# Patient Record
Sex: FEMALE | Race: WHITE | NOT HISPANIC OR LATINO | Employment: FULL TIME | URBAN - NONMETROPOLITAN AREA
[De-identification: names, ages, dates, MRNs, and addresses within clinical notes are randomized per-mention and may not be internally consistent; named-entity substitution may affect disease eponyms.]

---

## 2017-02-01 ENCOUNTER — OFFICE VISIT (OUTPATIENT)
Dept: FAMILY MEDICINE CLINIC | Facility: CLINIC | Age: 52
End: 2017-02-01

## 2017-02-01 VITALS
HEIGHT: 68 IN | OXYGEN SATURATION: 96 % | HEART RATE: 91 BPM | DIASTOLIC BLOOD PRESSURE: 70 MMHG | TEMPERATURE: 98.7 F | BODY MASS INDEX: 28.79 KG/M2 | SYSTOLIC BLOOD PRESSURE: 112 MMHG | WEIGHT: 190 LBS

## 2017-02-01 DIAGNOSIS — J06.9 ACUTE URI: Primary | ICD-10-CM

## 2017-02-01 DIAGNOSIS — R05.9 COUGH: ICD-10-CM

## 2017-02-01 DIAGNOSIS — G44.89 OTHER HEADACHE SYNDROME: ICD-10-CM

## 2017-02-01 DIAGNOSIS — F40.298 PHONOPHOBIA: ICD-10-CM

## 2017-02-01 DIAGNOSIS — H53.149 PHOTOPHOBIA: ICD-10-CM

## 2017-02-01 PROBLEM — F43.20 ADJUSTMENT REACTION: Status: ACTIVE | Noted: 2017-02-01

## 2017-02-01 PROBLEM — R63.5 WEIGHT GAIN: Status: ACTIVE | Noted: 2017-02-01

## 2017-02-01 PROBLEM — R94.31 ABNORMAL ECG: Status: ACTIVE | Noted: 2017-02-01

## 2017-02-01 PROBLEM — R00.2 PALPITATIONS: Status: ACTIVE | Noted: 2017-02-01

## 2017-02-01 PROBLEM — R19.8 SENSATION OF FOREIGN BODY IN ESOPHAGUS: Status: ACTIVE | Noted: 2017-02-01

## 2017-02-01 PROBLEM — R09.A2 SENSATION OF FOREIGN BODY IN ESOPHAGUS: Status: ACTIVE | Noted: 2017-02-01

## 2017-02-01 PROBLEM — R07.9 CHEST PAIN: Status: ACTIVE | Noted: 2017-02-01

## 2017-02-01 PROBLEM — R07.9 CHEST PAIN: Status: RESOLVED | Noted: 2017-02-01 | Resolved: 2017-02-01

## 2017-02-01 PROCEDURE — 99214 OFFICE O/P EST MOD 30 MIN: CPT | Performed by: PHYSICIAN ASSISTANT

## 2017-02-01 RX ORDER — CLINDAMYCIN HYDROCHLORIDE 300 MG/1
300 CAPSULE ORAL 4 TIMES DAILY
Qty: 40 CAPSULE | Refills: 0 | Status: SHIPPED | OUTPATIENT
Start: 2017-02-01 | End: 2017-03-20

## 2017-02-01 RX ORDER — FLUTICASONE PROPIONATE 50 MCG
2 SPRAY, SUSPENSION (ML) NASAL DAILY
Qty: 1 EACH | Refills: 0 | Status: SHIPPED | OUTPATIENT
Start: 2017-02-01 | End: 2017-03-03

## 2017-02-01 RX ORDER — GUAIFENESIN AND DEXTROMETHORPHAN HYDROBROMIDE 600; 30 MG/1; MG/1
1 TABLET, EXTENDED RELEASE ORAL 2 TIMES DAILY
Qty: 45 TABLET | Refills: 0 | Status: SHIPPED | OUTPATIENT
Start: 2017-02-01 | End: 2017-03-20

## 2017-02-01 RX ORDER — CETIRIZINE HYDROCHLORIDE 10 MG/1
10 TABLET ORAL DAILY
Qty: 30 TABLET | Refills: 0 | Status: SHIPPED | OUTPATIENT
Start: 2017-02-01 | End: 2019-06-27

## 2017-02-01 RX ORDER — ACETAMINOPHEN, ASPIRIN AND CAFFEINE 250; 250; 65 MG/1; MG/1; MG/1
1 TABLET, FILM COATED ORAL EVERY 6 HOURS PRN
COMMUNITY
End: 2019-06-27

## 2017-02-01 NOTE — PROGRESS NOTES
"Subjective   Jd Perkins is a 51 y.o. female here today due to sinus pressure with greenish yellow drainage x 2 days, sore throat, cough AND HEADACHES FOR 1 MONTH    History of Present Illness     STARTED 1/30- SORE THROAT, CONGESTION, RUNNY NOSE, COUGHING, SNEEZING. NO V/D/FEVER. NO TREATMENT TODAY. NYQUIL LAST NIGHT AND EXCEDRIN FOR HEADACHE.     X 1 MONTH- WHEN COUGH OR SNEEZE, MAKES HEAD HURT UNTIL TAKES MEDICATION OR LAY DOWN. THIS WAS PRIOR TO GETTING SICK. HAS HAD MIGRAINE HEADACHE IN THE PAST - HAS NOT HAD MIGRAINE FOR 5 YEARS.  BRAIN SCAN AT AGE 19. HAS NOT HAD ANY OTHER BRAIN IMAGING. MOTHER AND GRANDMOTHER WITH MIGRAINE HEADACHES. DX WITH MIGRAINE HEADACHES BY DR STEEN. TOLD THAT SHE HAS CLUSTER MIGRAINES. TREATED WITH IMITREX AND TREXIMET. MAY HAVE 2 DAILY- REPORTS THEY RESOLVED THEN HAS NOT HAD IN 5-6 YEARS. HAS A HEADACHE- FEELS LIKE \"MY BRAIN IS SWIMMING OR FLOATING\"- IT'S ALL OVER BEHIND.     X 6-8 WEEKS AGO- HAS THE SAME FEELING IN THE SAME SPOT AS PREVIOUS MIGRAINES ONCE EVERY FEW DAYS AND EXCEDRIN MIGRAINE RELIEVES THEM. SOMETIMES MORE INTENSE THAN OTHERS- PHOTO-PHONOPHOBIA. NO NAUSEA, VOMITING, DIZZINESS, OTHER NEURO SYMPTOMS.     X 6-8 WEEKS- WHEN COULD COUGH OR SNEEZE HEAD HURTS ALL OVER. REPORTS HAS TO LAY DOWN FOR AN HOUR OR SO. REPORTS HAS HAD THE SAME HEADACHE BUT IN A DIFFERENT LOCATION WHEN HAS HAD SINUSITIS IN THE PAST. NOW BEHIND EYES. NO ASSOCIATED SYMPTOMS WITH THESE HEADACHES.     HAD [facility]- [facility]. RESOLVED. CAN STILL FEEL THAT ONE SIDE OF MOUTH IS NOT THE SAME AS THE OTHER. THEY ADVISED NO CVA SYMPTOMS.     HAS BEEN TAKING EXCEDRIN A COUPLE TIMES DAILY SOMETIMES. IF HOME ON WEEKENDS, MAY LAY DOWN INSTEAD OF TAKING THE EXCEDRIN.   The following portions of the patient's history were reviewed and updated as appropriate: allergies, current medications, past family history, past medical history, past social history, past surgical history and problem " list.    Review of Systems   Constitutional: Positive for fatigue.   HENT: Positive for congestion, sinus pressure and sore throat.    Respiratory: Positive for cough.    Neurological: Positive for headaches.   All other systems reviewed and are negative.      Objective   Physical Exam   Constitutional: She is oriented to person, place, and time. Vital signs are normal. She appears well-developed and well-nourished.   HENT:   Head: Normocephalic and atraumatic.   Right Ear: Tympanic membrane, external ear and ear canal normal.   Left Ear: Tympanic membrane, external ear and ear canal normal.   Nose: Nose normal.   Mouth/Throat: Uvula is midline, oropharynx is clear and moist and mucous membranes are normal.   Eyes: Conjunctivae and EOM are normal. Pupils are equal, round, and reactive to light.   Neck: Neck supple.   Cardiovascular: Normal rate, regular rhythm, normal heart sounds and intact distal pulses.  Exam reveals no gallop and no friction rub.    No murmur heard.  Pulmonary/Chest: Effort normal and breath sounds normal. No respiratory distress. She has no wheezes. She has no rhonchi. She has no rales.   Musculoskeletal: She exhibits no edema or deformity.   Neurological: She is alert and oriented to person, place, and time. She has normal strength and normal reflexes. She is not disoriented. No cranial nerve deficit or sensory deficit.   Skin: Skin is warm and dry.   Psychiatric: She has a normal mood and affect. Her speech is normal and behavior is normal. Judgment and thought content normal. She is not actively hallucinating. Cognition and memory are normal. She is attentive.   Nursing note and vitals reviewed.      Assessment/Plan   Jd was seen today for cough, headache, sinus pressure and sore throat.    Diagnoses and all orders for this visit:    Acute URI  -     cetirizine (zyrTEC) 10 MG tablet; Take 1 tablet by mouth Daily.  -     guaifenesin-dextromethorphan (MUCINEX DM)  MG tablet  "sustained-release 12 hour tablet; Take 1 tablet by mouth 2 (Two) Times a Day.  -     fluticasone (FLONASE) 50 MCG/ACT nasal spray; 2 sprays into each nostril Daily for 30 days. Administer 2 sprays in each nostril for each dose.  -     clindamycin (CLEOCIN) 300 MG capsule; Take 1 capsule by mouth 4 (Four) Times a Day.    Cough    Other headache syndrome  -     MRI Angiogram Head Without Contrast  -     MRI Brain With & Without Contrast    Photophobia  -     MRI Angiogram Head Without Contrast  -     MRI Brain With & Without Contrast    Phonophobia  -     MRI Angiogram Head Without Contrast  -     MRI Brain With & Without Contrast      Patient Instructions   51 YEAR OLD FEMALE WHO PRESENTS TODAY WITH 2 DIFFERENT HEADACHE SYMPTOMS X 6-8 WEEKS. SHE STATES SHE WAS PREVIOUSLY DIAGNOSED WITH MIGRAINE HEADACHES ABOUT 15 YEARS AGO. SHE STATES SHE HAS NOT HAD THESE HEADACHES IN 5-6 YEARS. PATIENT REPORTS HER MIGRAINES ARE GENERALLY RIGHT SIDED HEADACHES ASSOCIATED WITH PHOTOPHOBIA AND PHONOPHOBIA. NO NAUSEA, VISION CHANGES, DIZZINESS OR OTHER NEUROLOGICAL SYMPTOMS. THEY SEEM TO HAVE RECURRED ABOUT 6-8 WEEKS AGO AND ARE OCCURRING EVERY FEW DAYS. USUALLY RELIEVED BY EXCEDRIN MIGRAINE. PATIENT ALSO STARTED WITH HEADACHE \"ALL OVER\" HER HEAD THEN REPORTS THEY ARE BEHIND HER EYES- LOCATION IS DIFFICULT TO DETERMINE. SHE STATES THEY OCCUR WHEN SHE COUGHS O SNEEZES AND ARE VERY SEVERE. SHE HAS TO GO LAY DOWN WITH THESE HEADACHES. NO OTHER ASSOCIATED SYMPTOMS THAT SHE IS AWARE. PATIENT IS POOR HISTORIAN, SO HISTORY AND ASSOCIATED SYMPTOMS ARE DIFFICULT TO DETERMINE, HOWEVER, IT IS CLEAR SHE HAS HAD RECURRENCE OF WHAT SHE RELATES TO PREVIOUSLY DIAGNOSED MIGRAINE HEADACHES THAT HAD RESOLVED FOR 5-6 YEARS AS WELL AS A NEW SYMPTOM OF SEVERE HEADACHE WITH COUGHING AND SNEEZING. FAMILY HISTORY IS SIGNIFICANT FOR MOTHER AND GRANDMOTHER WITH MIGRAINE HEADACHES. SHE IS TAKING EXCEDRIN MIGRAINE A COUPLE TIMES DAILY MOST OF THE TIME. SOMETIMES " "SHE CAN LAY DOWN ON WEEKENDS WITHOUT TAKING MEDICATION. PATIENT STATES SHE HAD A \"BRAIN SCAN\" AT AGE 19 BUT NO IMAGING SINCE. I WILL CHECK MRI/MRA BRAIN. PATIENT WAS COUNSELED ABOUT REBOUND HEADACHES. SHE SHOULD AVOID OTC MEDICATIONS, SUCH AS TYLENOL, IBUPROFEN, ALEVE, EXCEDRIN MIGRAINE FOR NOW. SHE MAY BE ABLE TO TAKE OCCASIONALLY IN THE FUTURE BUT SHOULD AVOID THEM TO SEE IF WE CAN DECREASE THE FREQUENCY OF HER HEADACHES. IF NORMAL MRI/MRA, WE CAN CONSIDER ELAVIL AT BEDTIME TO SEE IF THIS DECREASES THE FREQUENCY OF HEADACHES. SHE HAS DONE WELL ON IMITREX AND TREXIMET IN THE PAST. WE MAY NEED TO GIVE THESE AS ABORTIVE MEDICATIONS.     SINCE 1/30/17, INCREASED SORE THROAT, RHINORRHEA, CONGESTION, COUGHING AND SNEEZING. I WILL HAVE HER TAKE ZYRTEC AND FLONASE. WITH INCREASED FREQUENCY OF HEADACHES X 6-8 WEEKS (ONE TYPE SHE RELATES AS SIMILAR TO WHEN SHE HAS SINUSITIS), I WILL GIVE CLINDAMYCIN 4 X DAILY. SHE SHOULD BE SEEN IF NO IMPROVEMENT OR WORSENING.     OTHERWISE, AWAIT IMAGING. PATIENT DOES NEED TO RETURN FOR ROUTINE FOLLOW UP AND CPE.              "

## 2017-02-07 ENCOUNTER — HOSPITAL ENCOUNTER (OUTPATIENT)
Dept: MRI IMAGING | Facility: HOSPITAL | Age: 52
Discharge: HOME | End: 2017-02-07
Admitting: PHYSICIAN ASSISTANT

## 2017-02-07 PROCEDURE — 70553 MRI BRAIN STEM W/O & W/DYE: CPT

## 2017-02-07 PROCEDURE — 0 GADOBENATE DIMEGLUMINE 529 MG/ML SOLUTION: Performed by: PHYSICIAN ASSISTANT

## 2017-02-07 PROCEDURE — 70544 MR ANGIOGRAPHY HEAD W/O DYE: CPT

## 2017-02-07 PROCEDURE — 82565 ASSAY OF CREATININE: CPT

## 2017-02-07 PROCEDURE — A9577 INJ MULTIHANCE: HCPCS | Performed by: PHYSICIAN ASSISTANT

## 2017-02-07 RX ADMIN — GADOBENATE DIMEGLUMINE 18 ML: 529 INJECTION, SOLUTION INTRAVENOUS at 11:30

## 2017-02-08 DIAGNOSIS — R90.89 ABNORMAL BRAIN MRI: Primary | ICD-10-CM

## 2017-02-08 DIAGNOSIS — H53.149 PHOTOPHOBIA: ICD-10-CM

## 2017-02-08 DIAGNOSIS — F40.298 PHONOPHOBIA: ICD-10-CM

## 2017-02-08 DIAGNOSIS — G44.89 OTHER HEADACHE SYNDROME: ICD-10-CM

## 2017-02-08 LAB — CREAT BLDA-MCNC: 0.7 MG/DL (ref 0.6–1.3)

## 2017-02-09 NOTE — PATIENT INSTRUCTIONS
"51 YEAR OLD FEMALE WHO PRESENTS TODAY WITH 2 DIFFERENT HEADACHE SYMPTOMS X 6-8 WEEKS. SHE STATES SHE WAS PREVIOUSLY DIAGNOSED WITH MIGRAINE HEADACHES ABOUT 15 YEARS AGO. SHE STATES SHE HAS NOT HAD THESE HEADACHES IN 5-6 YEARS. PATIENT REPORTS HER MIGRAINES ARE GENERALLY RIGHT SIDED HEADACHES ASSOCIATED WITH PHOTOPHOBIA AND PHONOPHOBIA. NO NAUSEA, VISION CHANGES, DIZZINESS OR OTHER NEUROLOGICAL SYMPTOMS. THEY SEEM TO HAVE RECURRED ABOUT 6-8 WEEKS AGO AND ARE OCCURRING EVERY FEW DAYS. USUALLY RELIEVED BY EXCEDRIN MIGRAINE. PATIENT ALSO STARTED WITH HEADACHE \"ALL OVER\" HER HEAD THEN REPORTS THEY ARE BEHIND HER EYES- LOCATION IS DIFFICULT TO DETERMINE. SHE STATES THEY OCCUR WHEN SHE COUGHS O SNEEZES AND ARE VERY SEVERE. SHE HAS TO GO LAY DOWN WITH THESE HEADACHES. NO OTHER ASSOCIATED SYMPTOMS THAT SHE IS AWARE. PATIENT IS POOR HISTORIAN, SO HISTORY AND ASSOCIATED SYMPTOMS ARE DIFFICULT TO DETERMINE, HOWEVER, IT IS CLEAR SHE HAS HAD RECURRENCE OF WHAT SHE RELATES TO PREVIOUSLY DIAGNOSED MIGRAINE HEADACHES THAT HAD RESOLVED FOR 5-6 YEARS AS WELL AS A NEW SYMPTOM OF SEVERE HEADACHE WITH COUGHING AND SNEEZING. FAMILY HISTORY IS SIGNIFICANT FOR MOTHER AND GRANDMOTHER WITH MIGRAINE HEADACHES. SHE IS TAKING EXCEDRIN MIGRAINE A COUPLE TIMES DAILY MOST OF THE TIME. SOMETIMES SHE CAN LAY DOWN ON WEEKENDS WITHOUT TAKING MEDICATION. PATIENT STATES SHE HAD A \"BRAIN SCAN\" AT AGE 19 BUT NO IMAGING SINCE. I WILL CHECK MRI/MRA BRAIN. PATIENT WAS COUNSELED ABOUT REBOUND HEADACHES. SHE SHOULD AVOID OTC MEDICATIONS, SUCH AS TYLENOL, IBUPROFEN, ALEVE, EXCEDRIN MIGRAINE FOR NOW. SHE MAY BE ABLE TO TAKE OCCASIONALLY IN THE FUTURE BUT SHOULD AVOID THEM TO SEE IF WE CAN DECREASE THE FREQUENCY OF HER HEADACHES. IF NORMAL MRI/MRA, WE CAN CONSIDER ELAVIL AT BEDTIME TO SEE IF THIS DECREASES THE FREQUENCY OF HEADACHES. SHE HAS DONE WELL ON IMITREX AND TREXIMET IN THE PAST. WE MAY NEED TO GIVE THESE AS ABORTIVE MEDICATIONS.     SINCE 1/30/17, " INCREASED SORE THROAT, RHINORRHEA, CONGESTION, COUGHING AND SNEEZING. I WILL HAVE HER TAKE ZYRTEC AND FLONASE. WITH INCREASED FREQUENCY OF HEADACHES X 6-8 WEEKS (ONE TYPE SHE RELATES AS SIMILAR TO WHEN SHE HAS SINUSITIS), I WILL GIVE CLINDAMYCIN 4 X DAILY. SHE SHOULD BE SEEN IF NO IMPROVEMENT OR WORSENING.     OTHERWISE, AWAIT IMAGING. PATIENT DOES NEED TO RETURN FOR ROUTINE FOLLOW UP AND CPE.

## 2017-03-20 ENCOUNTER — OFFICE VISIT (OUTPATIENT)
Dept: NEUROLOGY | Facility: CLINIC | Age: 52
End: 2017-03-20

## 2017-03-20 VITALS
WEIGHT: 194 LBS | BODY MASS INDEX: 29.4 KG/M2 | SYSTOLIC BLOOD PRESSURE: 147 MMHG | DIASTOLIC BLOOD PRESSURE: 82 MMHG | HEIGHT: 68 IN

## 2017-03-20 DIAGNOSIS — G44.83 COUGH HEADACHE: Primary | ICD-10-CM

## 2017-03-20 PROCEDURE — 99244 OFF/OP CNSLTJ NEW/EST MOD 40: CPT | Performed by: PSYCHIATRY & NEUROLOGY

## 2017-03-20 NOTE — PROGRESS NOTES
Subjective:     Patient ID: Jd Perkins is a 51 y.o. female.    History of Present Illness     The patient is a 51-year-old right-handed young lady who was seen for further evaluation of headaches related to cough or sneeze.I saw this patient today in consultation per the request of Yamile Cornejo.  History was also taken from her mother.  Patient has had a long-standing history of migraine type headaches but she has had none of these for about 5 years.  Since about December she has had headaches that occurred when she would cough they would go up toward her eyes across her head.  These were bilateral.  They are severe at times and clearly intermittent.  She had some congestion in January and was coughing more.  The headaches are worse.  Had an MRI of the brain 2/ 7/17 which showed right greater than left maxillary sinusitis as well as some proteinaceous fluid within the right petrous apex.  RA of the brain was normal.  She has been treated with antibodies.  The following portions of the patient's history were reviewed and updated as appropriate: allergies, current medications, past family history, past medical history, past social history, past surgical history and problem list.    Family History   Problem Relation Age of Onset   • Heart disease Father    • Migraines Mother    • Migraines Maternal Grandmother    • Heart disease Maternal Grandfather    • Cancer Maternal Grandfather    • Cancer Paternal Grandfather      Active Ambulatory Problems     Diagnosis Date Noted   • Abnormal ECG 02/01/2017   • Adjustment reaction 02/01/2017   • Cough 02/01/2017   • Palpitations 02/01/2017   • Sensation of foreign body in esophagus 02/01/2017   • Weight gain 02/01/2017   • Cough headache 03/20/2017     Resolved Ambulatory Problems     Diagnosis Date Noted   • Chest pain 02/01/2017     Past Medical History   Diagnosis Date   • Headache, tension-type    • Migraine      Social History     Social History   • Marital status:       Spouse name: N/A   • Number of children: N/A   • Years of education: N/A     Occupational History   • Not on file.     Social History Main Topics   • Smoking status: Current Every Day Smoker     Packs/day: 0.50   • Smokeless tobacco: Never Used   • Alcohol use Yes      Comment: LESS THAN 10 DRINKS PER YEAR   • Drug use: No   • Sexual activity: Not on file     Other Topics Concern   • Not on file     Social History Narrative       Current Outpatient Prescriptions:   •  aspirin-acetaminophen-caffeine (EXCEDRIN MIGRAINE) 250-250-65 MG per tablet, Take 1 tablet by mouth Every 6 (Six) Hours As Needed for headaches., Disp: , Rfl:   •  cetirizine (zyrTEC) 10 MG tablet, Take 1 tablet by mouth Daily., Disp: 30 tablet, Rfl: 0    Review of Systems   Constitutional: Positive for activity change, fatigue and unexpected weight change. Negative for appetite change, diaphoresis and fever.   Eyes: Negative.    Respiratory: Positive for cough. Negative for apnea, choking, chest tightness, shortness of breath, wheezing and stridor.    Cardiovascular: Negative.    Gastrointestinal: Negative.    Endocrine: Negative.    Musculoskeletal: Negative.    Skin: Negative.    Allergic/Immunologic: Negative.    Neurological: Positive for headaches. Negative for dizziness, tremors, seizures, syncope, facial asymmetry, speech difficulty, weakness, light-headedness and numbness.   Hematological: Negative.    Psychiatric/Behavioral: Negative.         Objective:    Neurologic Exam  Mental status examination showed normal orientation, memory, and speech.  Attention span and concentration were normal.  Fund of knowledge was normal.  Funduscopic showed no abnormality.  Visual fields were full.  Pupillary reflexes were 5 mm, symmetric, and equally reactive to light.  Eye movements were full and conjugate.  Gag reflex was normal.  Hearing was normal.  Muscles of mastication were normal.  No facial weakness was noted.  Shoulder shrug strength  was normal bilaterally.  Tongue protrudes midline.  There is no drift of outstretched arms.  Deep tendon reflexes are 2+ and symmetric.  No focal weakness or atrophy was noted.  Tone was normal in all extremities.  No cerebellar signs were noted.  No abnormal movements were noted.  The patient's gait was normal.  No other pathologic reflexes such as Babinski's sign were noted.  No sensory abnormalities were noted.  Physical Exam    Assessment/Plan:     Jd was seen today for headache.    Diagnoses and all orders for this visit:    Cough headache       The present time it is not clear what is causing her cough headache.  Usually cough causes increase in intracranial pressure.  Sinus disease could be doing this.  This can also be seen with problems a cause increased intracranial pressure but also a Chiari malformation.  Neither of these is present.  Not ordered further tests or place her on medicine at this time.  Asked her to call me in a month.  If she is not improving, we could consider treatment with steroid-induced.  Also evaluation by ENT may be helpful.  The long run we could repeat MRI of the brain to make sure nothing is running.    Thank you for allowing me to share in the care of this patient.  Prudencio Burleson M.D.

## 2019-06-27 ENCOUNTER — OFFICE VISIT (OUTPATIENT)
Dept: FAMILY MEDICINE CLINIC | Facility: CLINIC | Age: 54
End: 2019-06-27

## 2019-06-27 VITALS
OXYGEN SATURATION: 97 % | RESPIRATION RATE: 16 BRPM | HEART RATE: 60 BPM | DIASTOLIC BLOOD PRESSURE: 60 MMHG | SYSTOLIC BLOOD PRESSURE: 110 MMHG | HEIGHT: 68 IN | BODY MASS INDEX: 31.37 KG/M2 | WEIGHT: 207 LBS

## 2019-06-27 DIAGNOSIS — Z71.6 ENCOUNTER FOR SMOKING CESSATION COUNSELING: ICD-10-CM

## 2019-06-27 DIAGNOSIS — R53.82 CHRONIC FATIGUE: Primary | ICD-10-CM

## 2019-06-27 DIAGNOSIS — R63.5 UNINTENDED WEIGHT GAIN: ICD-10-CM

## 2019-06-27 DIAGNOSIS — R06.83 SNORING: ICD-10-CM

## 2019-06-27 PROCEDURE — 99214 OFFICE O/P EST MOD 30 MIN: CPT | Performed by: NURSE PRACTITIONER

## 2019-06-27 RX ORDER — BUPROPION HYDROCHLORIDE 150 MG/1
150 TABLET ORAL DAILY
Qty: 30 TABLET | Refills: 5 | Status: SHIPPED | OUTPATIENT
Start: 2019-06-27 | End: 2020-02-17

## 2019-06-27 NOTE — PROGRESS NOTES
Subjective   Jd Perkins is a 54 y.o. female.     History of Present Illness   Jd Perkins 54 y.o. female who presents today for a new patient appointment.  She was previously seen at [facility] but is seeing me for the first time.  she has a history of   Patient Active Problem List   Diagnosis   • Abnormal ECG   • Adjustment reaction   • Cough   • Palpitations   • Sensation of foreign body in esophagus   • Weight gain   • Cough headache   .  she is here to establish care I reviewed the PFSH recorded today by my MA/LPN staff.   she   She has been feeling tired.      Reports increased fatigue and weight gain.  Reports about 50 lbs in the last year.  Reports increased fatigue and daytime drowsiness. Reports she snores but has not been diagnosed sleep apnea.        Hs not had menstrual period in 5 years.  She does have frequent hot flashes.   Has not had PAP or mammogram in 5 years but denies history of abnormal.  She is past due appt and will schedule with Dr. Corona.     Patient had colonoscopy 3 years ago which was normal.     Reports smoking currently 1/2 to 1 PPD and has since age 23.  She is interested in quitting smoking but worried about increased weight gain.   The following portions of the patient's history were reviewed and updated as appropriate: allergies, current medications, past family history, past medical history, past social history, past surgical history and problem list.    Review of Systems   Constitutional: Negative for unexpected weight change.   Respiratory: Negative for shortness of breath.    Cardiovascular: Negative for chest pain and palpitations.   Endocrine: Negative for cold intolerance, heat intolerance, polydipsia, polyphagia and polyuria.   Psychiatric/Behavioral: Negative for behavioral problems.       Objective   Physical Exam   Constitutional: She is oriented to person, place, and time. She appears well-developed and well-nourished.   Neck: Carotid bruit is not  present.   Cardiovascular: Normal rate and regular rhythm.   Pulmonary/Chest: Effort normal and breath sounds normal.   Neurological: She is alert and oriented to person, place, and time.   Psychiatric: She has a normal mood and affect. Judgment normal.   Nursing note and vitals reviewed.      Assessment/Plan   Jd was seen today for establish care, fatigue and weight gain.    Diagnoses and all orders for this visit:    Chronic fatigue  -     Comprehensive metabolic panel  -     Lipid panel  -     CBC and Differential  -     TSH  -     T4, free  -     Ambulatory Referral to Sleep Medicine  -     Vitamin D 25 Hydroxy    Unintended weight gain  -     Comprehensive metabolic panel  -     Lipid panel  -     CBC and Differential  -     TSH  -     T4, free  -     Ambulatory Referral to Sleep Medicine  -     Vitamin D 25 Hydroxy    Snoring  -     Comprehensive metabolic panel  -     Lipid panel  -     CBC and Differential  -     TSH  -     T4, free  -     Ambulatory Referral to Sleep Medicine  -     Vitamin D 25 Hydroxy    Encounter for smoking cessation counseling  -     buPROPion XL (WELLBUTRIN XL) 150 MG 24 hr tablet; Take 1 tablet by mouth Daily.      Labs ordered.    Referral to sleep medicine.  Wellbutrin for smoking cessation. Will have her f/u in 2 months.

## 2019-06-29 LAB
25(OH)D3+25(OH)D2 SERPL-MCNC: 22.3 NG/ML (ref 30–100)
ALBUMIN SERPL-MCNC: 4.7 G/DL (ref 3.5–5.5)
ALBUMIN/GLOB SERPL: 2 {RATIO} (ref 1.2–2.2)
ALP SERPL-CCNC: 92 IU/L (ref 39–117)
ALT SERPL-CCNC: 35 IU/L (ref 0–32)
AST SERPL-CCNC: 20 IU/L (ref 0–40)
BASOPHILS # BLD AUTO: 0 X10E3/UL (ref 0–0.2)
BASOPHILS NFR BLD AUTO: 1 %
BILIRUB SERPL-MCNC: 0.3 MG/DL (ref 0–1.2)
BUN SERPL-MCNC: 17 MG/DL (ref 6–24)
BUN/CREAT SERPL: 21 (ref 9–23)
CALCIUM SERPL-MCNC: 10 MG/DL (ref 8.7–10.2)
CHLORIDE SERPL-SCNC: 105 MMOL/L (ref 96–106)
CHOLEST SERPL-MCNC: 252 MG/DL (ref 100–199)
CO2 SERPL-SCNC: 23 MMOL/L (ref 20–29)
CREAT SERPL-MCNC: 0.8 MG/DL (ref 0.57–1)
EOSINOPHIL # BLD AUTO: 0.1 X10E3/UL (ref 0–0.4)
EOSINOPHIL NFR BLD AUTO: 2 %
ERYTHROCYTE [DISTWIDTH] IN BLOOD BY AUTOMATED COUNT: 13.1 % (ref 12.3–15.4)
GLOBULIN SER CALC-MCNC: 2.3 G/DL (ref 1.5–4.5)
GLUCOSE SERPL-MCNC: 98 MG/DL (ref 65–99)
HCT VFR BLD AUTO: 43.4 % (ref 34–46.6)
HDLC SERPL-MCNC: 40 MG/DL
HGB BLD-MCNC: 14.6 G/DL (ref 11.1–15.9)
IMM GRANULOCYTES # BLD AUTO: 0 X10E3/UL (ref 0–0.1)
IMM GRANULOCYTES NFR BLD AUTO: 0 %
LDLC SERPL CALC-MCNC: 161 MG/DL (ref 0–99)
LYMPHOCYTES # BLD AUTO: 2.4 X10E3/UL (ref 0.7–3.1)
LYMPHOCYTES NFR BLD AUTO: 38 %
MCH RBC QN AUTO: 30.6 PG (ref 26.6–33)
MCHC RBC AUTO-ENTMCNC: 33.6 G/DL (ref 31.5–35.7)
MCV RBC AUTO: 91 FL (ref 79–97)
MONOCYTES # BLD AUTO: 0.5 X10E3/UL (ref 0.1–0.9)
MONOCYTES NFR BLD AUTO: 9 %
NEUTROPHILS # BLD AUTO: 3.1 X10E3/UL (ref 1.4–7)
NEUTROPHILS NFR BLD AUTO: 50 %
PLATELET # BLD AUTO: 190 X10E3/UL (ref 150–450)
POTASSIUM SERPL-SCNC: 4.7 MMOL/L (ref 3.5–5.2)
PROT SERPL-MCNC: 7 G/DL (ref 6–8.5)
RBC # BLD AUTO: 4.77 X10E6/UL (ref 3.77–5.28)
SODIUM SERPL-SCNC: 143 MMOL/L (ref 134–144)
T4 FREE SERPL-MCNC: 0.97 NG/DL (ref 0.82–1.77)
TRIGL SERPL-MCNC: 253 MG/DL (ref 0–149)
TSH SERPL DL<=0.005 MIU/L-ACNC: 0.87 UIU/ML (ref 0.45–4.5)
VLDLC SERPL CALC-MCNC: 51 MG/DL (ref 5–40)
WBC # BLD AUTO: 6.2 X10E3/UL (ref 3.4–10.8)

## 2019-07-17 ENCOUNTER — APPOINTMENT (OUTPATIENT)
Dept: SLEEP MEDICINE | Facility: HOSPITAL | Age: 54
End: 2019-07-17

## 2019-09-25 ENCOUNTER — HOSPITAL ENCOUNTER (OUTPATIENT)
Dept: OTHER | Facility: HOSPITAL | Age: 54
Discharge: HOME | End: 2019-09-25
Attending: SPECIALIST

## 2020-02-03 ENCOUNTER — OFFICE VISIT (OUTPATIENT)
Dept: FAMILY MEDICINE CLINIC | Facility: CLINIC | Age: 55
End: 2020-02-03

## 2020-02-03 VITALS
DIASTOLIC BLOOD PRESSURE: 68 MMHG | HEART RATE: 78 BPM | SYSTOLIC BLOOD PRESSURE: 110 MMHG | WEIGHT: 202 LBS | RESPIRATION RATE: 16 BRPM | BODY MASS INDEX: 30.62 KG/M2 | OXYGEN SATURATION: 96 % | HEIGHT: 68 IN

## 2020-02-03 DIAGNOSIS — Z71.6 ENCOUNTER FOR SMOKING CESSATION COUNSELING: Primary | ICD-10-CM

## 2020-02-03 DIAGNOSIS — E55.9 VITAMIN D DEFICIENCY: ICD-10-CM

## 2020-02-03 DIAGNOSIS — R40.0 HAS DAYTIME DROWSINESS: ICD-10-CM

## 2020-02-03 DIAGNOSIS — F41.9 ANXIETY: ICD-10-CM

## 2020-02-03 PROCEDURE — 99213 OFFICE O/P EST LOW 20 MIN: CPT | Performed by: NURSE PRACTITIONER

## 2020-02-03 RX ORDER — ESTRADIOL/NORGESTIMATE 1-1-0.09MG
KIT ORAL
COMMUNITY
Start: 2020-01-20 | End: 2021-08-02

## 2020-02-03 NOTE — PROGRESS NOTES
"Subjective   Jd Perkins is a 54 y.o. female.     History of Present Illness   Jd Perkins female 54 y.o., /68   Pulse 78   Resp 16   Ht 172.7 cm (68\")   Wt 91.6 kg (202 lb)   SpO2 96%   BMI 30.71 kg/m²   who presents today for follow up of Anxiety.  She reports medication is working well, patient desires to continue on Rx, and needs refill.     Patient was started on Wellbutrin for smoking cessation but feels that it has helped her anxiety and depression.  She has reduced down to to a pack every few days.     Still has daytime drowsiness.  She did not do sleep study yet, but understands she may have sleep apnea and will need study for proper diagnosis.   The following portions of the patient's history were reviewed and updated as appropriate: allergies, current medications, past family history, past medical history, past social history, past surgical history and problem list.    Review of Systems   Constitutional: Negative for unexpected weight change.   Respiratory: Negative for shortness of breath.    Cardiovascular: Negative for chest pain and palpitations.   Psychiatric/Behavioral: Negative for behavioral problems.       Objective   Physical Exam   Constitutional: She is oriented to person, place, and time. She appears well-developed and well-nourished.   Neck: Carotid bruit is not present.   Cardiovascular: Normal rate and regular rhythm.   Pulmonary/Chest: Effort normal and breath sounds normal.   Neurological: She is alert and oriented to person, place, and time.   Psychiatric: She has a normal mood and affect. Judgment normal.   Nursing note and vitals reviewed.      Assessment/Plan   Jd was seen today for anxiety, depression and med refill.    Diagnoses and all orders for this visit:    Encounter for smoking cessation counseling    Vitamin D deficiency  -     Comprehensive metabolic panel  -     Lipid panel  -     CBC and Differential  -     TSH  -     T4, free  -     Vitamin D 25 " Hydroxy    Has daytime drowsiness    Anxiety        Will call to schedule with sleep medicine.

## 2020-02-04 LAB
25(OH)D3+25(OH)D2 SERPL-MCNC: 45.3 NG/ML (ref 30–100)
ALBUMIN SERPL-MCNC: 4.7 G/DL (ref 3.5–5.2)
ALBUMIN/GLOB SERPL: 2.2 G/DL
ALP SERPL-CCNC: 83 U/L (ref 39–117)
ALT SERPL-CCNC: 21 U/L (ref 1–33)
AST SERPL-CCNC: 19 U/L (ref 1–32)
BASOPHILS # BLD AUTO: 0.04 10*3/MM3 (ref 0–0.2)
BASOPHILS NFR BLD AUTO: 0.7 % (ref 0–1.5)
BILIRUB SERPL-MCNC: 0.5 MG/DL (ref 0.2–1.2)
BUN SERPL-MCNC: 27 MG/DL (ref 6–20)
BUN/CREAT SERPL: 39.1 (ref 7–25)
CALCIUM SERPL-MCNC: 9.7 MG/DL (ref 8.6–10.5)
CHLORIDE SERPL-SCNC: 104 MMOL/L (ref 98–107)
CHOLEST SERPL-MCNC: 236 MG/DL (ref 0–200)
CO2 SERPL-SCNC: 23.7 MMOL/L (ref 22–29)
CREAT SERPL-MCNC: 0.69 MG/DL (ref 0.57–1)
EOSINOPHIL # BLD AUTO: 0.1 10*3/MM3 (ref 0–0.4)
EOSINOPHIL NFR BLD AUTO: 1.7 % (ref 0.3–6.2)
ERYTHROCYTE [DISTWIDTH] IN BLOOD BY AUTOMATED COUNT: 12.6 % (ref 12.3–15.4)
GLOBULIN SER CALC-MCNC: 2.1 GM/DL
GLUCOSE SERPL-MCNC: 93 MG/DL (ref 65–99)
HCT VFR BLD AUTO: 44.7 % (ref 34–46.6)
HDLC SERPL-MCNC: 41 MG/DL (ref 40–60)
HGB BLD-MCNC: 15.3 G/DL (ref 12–15.9)
IMM GRANULOCYTES # BLD AUTO: 0.03 10*3/MM3 (ref 0–0.05)
IMM GRANULOCYTES NFR BLD AUTO: 0.5 % (ref 0–0.5)
LDLC SERPL CALC-MCNC: 160 MG/DL (ref 0–100)
LYMPHOCYTES # BLD AUTO: 1.7 10*3/MM3 (ref 0.7–3.1)
LYMPHOCYTES NFR BLD AUTO: 29.7 % (ref 19.6–45.3)
MCH RBC QN AUTO: 31.5 PG (ref 26.6–33)
MCHC RBC AUTO-ENTMCNC: 34.2 G/DL (ref 31.5–35.7)
MCV RBC AUTO: 92 FL (ref 79–97)
MONOCYTES # BLD AUTO: 0.45 10*3/MM3 (ref 0.1–0.9)
MONOCYTES NFR BLD AUTO: 7.9 % (ref 5–12)
NEUTROPHILS # BLD AUTO: 3.4 10*3/MM3 (ref 1.7–7)
NEUTROPHILS NFR BLD AUTO: 59.5 % (ref 42.7–76)
NRBC BLD AUTO-RTO: 0 /100 WBC (ref 0–0.2)
PLATELET # BLD AUTO: 207 10*3/MM3 (ref 140–450)
POTASSIUM SERPL-SCNC: 5.1 MMOL/L (ref 3.5–5.2)
PROT SERPL-MCNC: 6.8 G/DL (ref 6–8.5)
RBC # BLD AUTO: 4.86 10*6/MM3 (ref 3.77–5.28)
SODIUM SERPL-SCNC: 141 MMOL/L (ref 136–145)
T4 FREE SERPL-MCNC: 1.2 NG/DL (ref 0.93–1.7)
TRIGL SERPL-MCNC: 175 MG/DL (ref 0–150)
TSH SERPL DL<=0.005 MIU/L-ACNC: 0.45 UIU/ML (ref 0.27–4.2)
VLDLC SERPL CALC-MCNC: 35 MG/DL
WBC # BLD AUTO: 5.72 10*3/MM3 (ref 3.4–10.8)

## 2020-02-15 DIAGNOSIS — Z71.6 ENCOUNTER FOR SMOKING CESSATION COUNSELING: ICD-10-CM

## 2020-02-17 RX ORDER — BUPROPION HYDROCHLORIDE 150 MG/1
TABLET ORAL
Qty: 30 TABLET | Refills: 4 | Status: SHIPPED | OUTPATIENT
Start: 2020-02-17 | End: 2020-08-18

## 2020-08-18 ENCOUNTER — OFFICE VISIT (OUTPATIENT)
Dept: FAMILY MEDICINE CLINIC | Facility: CLINIC | Age: 55
End: 2020-08-18

## 2020-08-18 VITALS
OXYGEN SATURATION: 97 % | TEMPERATURE: 98.6 F | WEIGHT: 209 LBS | SYSTOLIC BLOOD PRESSURE: 100 MMHG | BODY MASS INDEX: 31.67 KG/M2 | RESPIRATION RATE: 16 BRPM | HEIGHT: 68 IN | DIASTOLIC BLOOD PRESSURE: 60 MMHG | HEART RATE: 63 BPM

## 2020-08-18 DIAGNOSIS — Z71.6 ENCOUNTER FOR SMOKING CESSATION COUNSELING: ICD-10-CM

## 2020-08-18 PROCEDURE — 99213 OFFICE O/P EST LOW 20 MIN: CPT | Performed by: NURSE PRACTITIONER

## 2020-08-18 RX ORDER — BUPROPION HYDROCHLORIDE 300 MG/1
300 TABLET ORAL DAILY
Qty: 90 TABLET | Refills: 1 | Status: SHIPPED | OUTPATIENT
Start: 2020-08-18 | End: 2021-08-02

## 2020-08-18 NOTE — PROGRESS NOTES
Subjective   Jd Perkins is a 55 y.o. female.     History of Present Illness   Patient presents to office for refill of Wellbutrin which she takes for smoking cessation.  She states she feels well on medication but has not decreased smoking since being on medication.  She is still smoking about 0.5 PPD. She denies feeling much craving for cigarettes but will habitually smoke with certain activities.  She understands she needs to change habit.     She is still having chronic fatigue and has not yet scheduled sleep study.  She states she has the number and will call to set this up.       The following portions of the patient's history were reviewed and updated as appropriate: allergies, current medications, past family history, past medical history, past social history, past surgical history and problem list.    Review of Systems   Constitutional: Positive for fatigue. Negative for unexpected weight change.   Respiratory: Negative for shortness of breath.    Cardiovascular: Negative for chest pain and palpitations.   Psychiatric/Behavioral: Negative for behavioral problems.       Objective   Physical Exam   Constitutional: She is oriented to person, place, and time. She appears well-developed and well-nourished.   Neck: Carotid bruit is not present.   Cardiovascular: Normal rate and regular rhythm.   Pulmonary/Chest: Effort normal and breath sounds normal.   Neurological: She is alert and oriented to person, place, and time.   Psychiatric: She has a normal mood and affect. Judgment normal.   Nursing note and vitals reviewed.      Assessment/Plan   Jd was seen today for nicotine dependence.    Diagnoses and all orders for this visit:    Encounter for smoking cessation counseling  -     buPROPion XL (Wellbutrin XL) 300 MG 24 hr tablet; Take 1 tablet by mouth Daily.    Jd was seen today for nicotine dependence.    Discussed labs from February. Discussed low fat/low cholesterol diet and exercise to lower LDL.      Increased dose of Wellbutrin.

## 2020-08-18 NOTE — PATIENT INSTRUCTIONS

## 2020-10-01 ENCOUNTER — HOSPITAL ENCOUNTER (OUTPATIENT)
Dept: OTHER | Facility: HOSPITAL | Age: 55
Discharge: HOME | End: 2020-10-01
Attending: SPECIALIST

## 2021-02-02 RX ORDER — BUPROPION HYDROCHLORIDE 150 MG/1
TABLET ORAL
Qty: 30 TABLET | Refills: 0 | Status: SHIPPED | OUTPATIENT
Start: 2021-02-02 | End: 2021-08-02

## 2021-08-02 ENCOUNTER — OFFICE VISIT (OUTPATIENT)
Dept: FAMILY MEDICINE CLINIC | Facility: CLINIC | Age: 56
End: 2021-08-02

## 2021-08-02 VITALS
RESPIRATION RATE: 16 BRPM | TEMPERATURE: 97.5 F | DIASTOLIC BLOOD PRESSURE: 60 MMHG | SYSTOLIC BLOOD PRESSURE: 110 MMHG | WEIGHT: 206 LBS | BODY MASS INDEX: 31.22 KG/M2 | HEIGHT: 68 IN

## 2021-08-02 DIAGNOSIS — R40.0 HAS DAYTIME DROWSINESS: Primary | ICD-10-CM

## 2021-08-02 DIAGNOSIS — E55.9 VITAMIN D DEFICIENCY: ICD-10-CM

## 2021-08-02 PROCEDURE — 99213 OFFICE O/P EST LOW 20 MIN: CPT | Performed by: NURSE PRACTITIONER

## 2021-08-02 NOTE — PROGRESS NOTES
Subjective   Jd Perkins is a 56 y.o. female.     History of Present Illness   Jd Perkins 56 y.o. female who presents for evaluation of sinus pressure and congestion. Symptoms include congestion, post nasal drip and dry cough.  Onset of symptoms was several months ago, unchanged since that time. Patient denies shortness of breath, wheezing, pleuritic chest pain, fever, sinus pain.   Evaluation to date: none Treatment to date:  none.     Patient was unable to get sleep study previously due to insurance not covering but has new insurance and needs reordered.   The following portions of the patient's history were reviewed and updated as appropriate: allergies, current medications, past family history, past medical history, past social history, past surgical history and problem list.    Review of Systems   Constitutional: Negative for chills, fever and unexpected weight change.   HENT: Positive for congestion, postnasal drip and sinus pressure. Negative for ear pain and sinus pain.    Respiratory: Negative for shortness of breath.    Cardiovascular: Negative for chest pain and palpitations.   Endocrine: Negative for cold intolerance, heat intolerance, polydipsia, polyphagia and polyuria.   Psychiatric/Behavioral: Negative for behavioral problems.       Objective   Physical Exam  Vitals and nursing note reviewed.   Constitutional:       Appearance: Normal appearance. She is well-developed.   Neck:      Vascular: No carotid bruit.   Cardiovascular:      Rate and Rhythm: Normal rate and regular rhythm.   Pulmonary:      Effort: Pulmonary effort is normal.      Breath sounds: Normal breath sounds.   Neurological:      Mental Status: She is alert and oriented to person, place, and time.   Psychiatric:         Mood and Affect: Mood normal.         Behavior: Behavior normal.         Thought Content: Thought content normal.         Judgment: Judgment normal.         Assessment/Plan   Diagnoses and all orders for this  visit:    1. Has daytime drowsiness (Primary)  -     Ambulatory Referral to Sleep Medicine  -     Comprehensive metabolic panel  -     Lipid panel  -     CBC and Differential  -     TSH  -     Vitamin D 25 Hydroxy    2. Vitamin D deficiency  -     Comprehensive metabolic panel  -     Lipid panel  -     CBC and Differential  -     TSH  -     Vitamin D 25 Hydroxy

## 2021-08-03 LAB
25(OH)D3+25(OH)D2 SERPL-MCNC: 35.6 NG/ML (ref 30–100)
ALBUMIN SERPL-MCNC: 4.4 G/DL (ref 3.5–5.2)
ALBUMIN/GLOB SERPL: 2.2 G/DL
ALP SERPL-CCNC: 83 U/L (ref 39–117)
ALT SERPL-CCNC: 23 U/L (ref 1–33)
AST SERPL-CCNC: 17 U/L (ref 1–32)
BASOPHILS # BLD AUTO: 0.03 10*3/MM3 (ref 0–0.2)
BASOPHILS NFR BLD AUTO: 0.5 % (ref 0–1.5)
BILIRUB SERPL-MCNC: 0.4 MG/DL (ref 0–1.2)
BUN SERPL-MCNC: 17 MG/DL (ref 6–20)
BUN/CREAT SERPL: 22.4 (ref 7–25)
CALCIUM SERPL-MCNC: 9.5 MG/DL (ref 8.6–10.5)
CHLORIDE SERPL-SCNC: 105 MMOL/L (ref 98–107)
CHOLEST SERPL-MCNC: 247 MG/DL (ref 0–200)
CO2 SERPL-SCNC: 26.8 MMOL/L (ref 22–29)
CREAT SERPL-MCNC: 0.76 MG/DL (ref 0.57–1)
EOSINOPHIL # BLD AUTO: 0.11 10*3/MM3 (ref 0–0.4)
EOSINOPHIL NFR BLD AUTO: 1.8 % (ref 0.3–6.2)
ERYTHROCYTE [DISTWIDTH] IN BLOOD BY AUTOMATED COUNT: 12.7 % (ref 12.3–15.4)
GLOBULIN SER CALC-MCNC: 2 GM/DL
GLUCOSE SERPL-MCNC: 97 MG/DL (ref 65–99)
HCT VFR BLD AUTO: 45.7 % (ref 34–46.6)
HDLC SERPL-MCNC: 43 MG/DL (ref 40–60)
HGB BLD-MCNC: 15.1 G/DL (ref 12–15.9)
IMM GRANULOCYTES # BLD AUTO: 0.03 10*3/MM3 (ref 0–0.05)
IMM GRANULOCYTES NFR BLD AUTO: 0.5 % (ref 0–0.5)
LDLC SERPL CALC-MCNC: 161 MG/DL (ref 0–100)
LYMPHOCYTES # BLD AUTO: 2.29 10*3/MM3 (ref 0.7–3.1)
LYMPHOCYTES NFR BLD AUTO: 38.2 % (ref 19.6–45.3)
MCH RBC QN AUTO: 31.1 PG (ref 26.6–33)
MCHC RBC AUTO-ENTMCNC: 33 G/DL (ref 31.5–35.7)
MCV RBC AUTO: 94 FL (ref 79–97)
MONOCYTES # BLD AUTO: 0.46 10*3/MM3 (ref 0.1–0.9)
MONOCYTES NFR BLD AUTO: 7.7 % (ref 5–12)
NEUTROPHILS # BLD AUTO: 3.08 10*3/MM3 (ref 1.7–7)
NEUTROPHILS NFR BLD AUTO: 51.3 % (ref 42.7–76)
NRBC BLD AUTO-RTO: 0 /100 WBC (ref 0–0.2)
PLATELET # BLD AUTO: 179 10*3/MM3 (ref 140–450)
POTASSIUM SERPL-SCNC: 4.6 MMOL/L (ref 3.5–5.2)
PROT SERPL-MCNC: 6.4 G/DL (ref 6–8.5)
RBC # BLD AUTO: 4.86 10*6/MM3 (ref 3.77–5.28)
SODIUM SERPL-SCNC: 140 MMOL/L (ref 136–145)
TRIGL SERPL-MCNC: 230 MG/DL (ref 0–150)
TSH SERPL DL<=0.005 MIU/L-ACNC: 0.56 UIU/ML (ref 0.27–4.2)
VLDLC SERPL CALC-MCNC: 43 MG/DL (ref 5–40)
WBC # BLD AUTO: 6 10*3/MM3 (ref 3.4–10.8)

## 2021-08-04 ENCOUNTER — DOCUMENTATION (OUTPATIENT)
Dept: FAMILY MEDICINE CLINIC | Facility: CLINIC | Age: 56
End: 2021-08-04

## 2021-08-04 RX ORDER — BUPROPION HYDROCHLORIDE 150 MG/1
150 TABLET ORAL DAILY
Qty: 30 TABLET | Refills: 5 | Status: SHIPPED | OUTPATIENT
Start: 2021-08-04 | End: 2022-03-30

## 2021-08-31 ENCOUNTER — OFFICE VISIT (OUTPATIENT)
Dept: SLEEP MEDICINE | Facility: HOSPITAL | Age: 56
End: 2021-08-31

## 2021-08-31 VITALS
BODY MASS INDEX: 30.62 KG/M2 | HEART RATE: 65 BPM | SYSTOLIC BLOOD PRESSURE: 122 MMHG | HEIGHT: 68 IN | OXYGEN SATURATION: 98 % | DIASTOLIC BLOOD PRESSURE: 65 MMHG | WEIGHT: 202 LBS

## 2021-08-31 DIAGNOSIS — R53.82 CHRONIC FATIGUE: ICD-10-CM

## 2021-08-31 DIAGNOSIS — G47.33 OSA (OBSTRUCTIVE SLEEP APNEA): Primary | ICD-10-CM

## 2021-08-31 DIAGNOSIS — G47.10 HYPERSOMNIA: ICD-10-CM

## 2021-08-31 DIAGNOSIS — R40.0 HAS DAYTIME DROWSINESS: ICD-10-CM

## 2021-08-31 PROCEDURE — G0463 HOSPITAL OUTPT CLINIC VISIT: HCPCS

## 2021-08-31 NOTE — PROGRESS NOTES
Sleep Medicine initial Consultation    Jd Perkins  : 1965  56 y.o. female   Date of Service: 2021  Referring provider: Halle Atwood,*    History Of Present Illness:   Ms. Jd Perkins  is a 56 y.o. left handed Caucasianfemale is seen in the sleep clinic for Excessive Daytime Sleepiness, Sleep Apnea and Chronic Fatigue.     The patient has a H/O Depression.    The patient c/o excessive daytime sleepiness,  and chronic fatigue.  There is no history of hypnagogic hallucinations, sleep paralysis or cataplexy.    The patient complains of snoring in supine position only, has morning headaches, excessive sweating during sleep and has gained 20 lbs of weight the the last 5 years.    There is no history of RLS or PLMD or bruxism.    The patient complains of Does not feel rested even after a long sleep and Still feels sleepy even when increasing sleep time.    There is no h/O sleepwalking or bedwetting or nightmares or sleep eating or acting out dreams.    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    Sleep schedule: While Working: Bed time: 11 pm and wake up time: 6 am: sleep Latency : 10 minutes and Total Sleep Time: 6-7 hours.     Naps: No.    Caffeine intake: Diet soft drinks and tea.  [place] Sleepiness Scale: 14/24.    Past Medical History:   Diagnosis Date   • Headache, tension-type    • Migraine      Past Surgical History:   Procedure Laterality Date   • TUBAL ABDOMINAL LIGATION       Current Outpatient Medications on File Prior to Visit   Medication Sig Dispense Refill   • buPROPion XL (Wellbutrin XL) 150 MG 24 hr tablet Take 1 tablet by mouth Daily for 30 days. 30 tablet 5     No current facility-administered medications on file prior to visit.     Allergies   Allergen Reactions   • Penicillins      Family History   Problem Relation Age of Onset   • Heart disease Father    • Migraines Mother    • Migraines Maternal Grandmother    • Heart disease Maternal Grandfather   "  • Cancer Maternal Grandfather    • Cancer Paternal Grandfather      Social History     Socioeconomic History   • Marital status:      Spouse name: Not on file   • Number of children: Not on file   • Years of education: Not on file   • Highest education level: Not on file   Tobacco Use   • Smoking status: Current Every Day Smoker     Packs/day: 0.50   • Smokeless tobacco: Never Used   Substance and Sexual Activity   • Alcohol use: Yes     Comment: LESS THAN 10 DRINKS PER YEAR   • Drug use: No     Review of Systems   Musculoskeletal: Positive for arthralgias.   Psychiatric/Behavioral: Positive for dysphoric mood.   All other systems reviewed and are negative.    Patient examination:  Vitals:    08/31/21 0900   BP: 122/65   Pulse: 65   SpO2: 98%   Weight: 91.6 kg (202 lb)   Height: 172.7 cm (68\")    Body mass index is 30.71 kg/m².  Neck Circumference: 14.25 inches   HEENT: Normal   Neck: Supple no carotid bruits.  Lungs: Clear to auscultation.  Cardiac exam: Normal and regular rate and rhythm. No murmurs.  Extremities: No edema clubbing or cyanosis.    ASSESSMENT AND PLAN:The patient has symptoms of obstructive sleep apnea syndrome with hypersomnia. We will proceed with polysomnography and treat with CPAP therapy if needed. Sleep hygiene techniques discussed.  Exercise diet and weight loss discussed.    Encounter Diagnoses   Name Primary?   • Has daytime drowsiness    • AWA (obstructive sleep apnea) Yes   • Chronic fatigue    • Hypersomnia      Orders Placed This Encounter   Procedures   • Home Sleep Study     Return in about 3 months (around 11/30/2021).    Brennon Sanz MD  8/31/2021  11:07 EDT  "

## 2021-09-16 ENCOUNTER — APPOINTMENT (OUTPATIENT)
Dept: SLEEP MEDICINE | Facility: HOSPITAL | Age: 56
End: 2021-09-16

## 2022-03-30 ENCOUNTER — OFFICE VISIT (OUTPATIENT)
Dept: FAMILY MEDICINE CLINIC | Facility: CLINIC | Age: 57
End: 2022-03-30

## 2022-03-30 VITALS
HEART RATE: 70 BPM | BODY MASS INDEX: 32.74 KG/M2 | DIASTOLIC BLOOD PRESSURE: 62 MMHG | TEMPERATURE: 97.5 F | OXYGEN SATURATION: 96 % | SYSTOLIC BLOOD PRESSURE: 110 MMHG | RESPIRATION RATE: 16 BRPM | WEIGHT: 216 LBS | HEIGHT: 68 IN

## 2022-03-30 DIAGNOSIS — Z72.0 TOBACCO ABUSE: ICD-10-CM

## 2022-03-30 DIAGNOSIS — E55.9 VITAMIN D DEFICIENCY: ICD-10-CM

## 2022-03-30 DIAGNOSIS — K21.9 GASTROESOPHAGEAL REFLUX DISEASE, UNSPECIFIED WHETHER ESOPHAGITIS PRESENT: ICD-10-CM

## 2022-03-30 DIAGNOSIS — Z12.11 SCREENING FOR COLORECTAL CANCER: ICD-10-CM

## 2022-03-30 DIAGNOSIS — R40.0 HAS DAYTIME DROWSINESS: Primary | ICD-10-CM

## 2022-03-30 DIAGNOSIS — Z11.59 ENCOUNTER FOR HEPATITIS C SCREENING TEST FOR LOW RISK PATIENT: ICD-10-CM

## 2022-03-30 DIAGNOSIS — Z12.12 SCREENING FOR COLORECTAL CANCER: ICD-10-CM

## 2022-03-30 PROCEDURE — 99214 OFFICE O/P EST MOD 30 MIN: CPT | Performed by: NURSE PRACTITIONER

## 2022-03-30 RX ORDER — OMEPRAZOLE 40 MG/1
40 CAPSULE, DELAYED RELEASE ORAL DAILY
Qty: 30 CAPSULE | Refills: 5 | Status: SHIPPED | OUTPATIENT
Start: 2022-03-30 | End: 2022-09-12

## 2022-03-30 NOTE — PROGRESS NOTES
Subjective   Jd Perkins is a 56 y.o. female.     History of Present Illness     The following portions of the patient's history were reviewed and updated as appropriate: allergies, current medications, past family history, past medical history, past social history, past surgical history and problem list. Answers for HPI/ROS submitted by the patient on 3/28/2022  Please describe your symptoms.: fatigue, possible sinus issues.   Need overall check up with bloodwork.  Have you had these symptoms before?: Yes  How long have you been having these symptoms?: Greater than 2 weeks  Please list any medications you are currently taking for this condition.: none  Please describe any probable cause for these symptoms. : I don't know.   I would like to see Dr. Atwood regarding this.  What is the primary reason for your visit?: Other    She is seeing sleep medicine for AWA.       She has been taking vitamin D and garlic. Needs labs rechecked.       She stopped taking Wellbutrin as it did not help her quit smoking.  She is still smoking about 0.5 PPD.      Review of Systems   Constitutional: Positive for fatigue. Negative for unexpected weight change.   Respiratory: Negative for shortness of breath.    Cardiovascular: Negative for chest pain and palpitations.   Psychiatric/Behavioral: Negative for behavioral problems.       Objective   Physical Exam  Vitals and nursing note reviewed.   Constitutional:       Appearance: Normal appearance. She is well-developed.   Neck:      Vascular: No carotid bruit.   Cardiovascular:      Rate and Rhythm: Normal rate and regular rhythm.   Pulmonary:      Effort: Pulmonary effort is normal.      Breath sounds: Normal breath sounds.   Neurological:      Mental Status: She is alert and oriented to person, place, and time.   Psychiatric:         Mood and Affect: Mood normal.         Behavior: Behavior normal.         Thought Content: Thought content normal.         Judgment: Judgment normal.          Assessment/Plan   Diagnoses and all orders for this visit:    1. Has daytime drowsiness (Primary)  -     Comprehensive metabolic panel  -     Lipid panel  -     CBC and Differential  -     TSH  -     T4, free  -     Vitamin D 25 Hydroxy  -     Vitamin B12  -     Folate    2. Vitamin D deficiency  -     Comprehensive metabolic panel  -     Lipid panel  -     CBC and Differential  -     TSH  -     T4, free  -     Vitamin D 25 Hydroxy  -     Vitamin B12  -     Folate    3. Screening for colorectal cancer  -     Ambulatory Referral to Gastroenterology    4. Gastroesophageal reflux disease, unspecified whether esophagitis present  -     Ambulatory Referral to Gastroenterology  -     omeprazole (priLOSEC) 40 MG capsule; Take 1 capsule by mouth Daily. One PO daily for GERD  Dispense: 30 capsule; Refill: 5    5. Encounter for hepatitis C screening test for low risk patient  -     Hepatitis C antibody        Will call to try to reschedule sleep study.

## 2022-03-31 LAB
25(OH)D3+25(OH)D2 SERPL-MCNC: 65.6 NG/ML (ref 30–100)
ALBUMIN SERPL-MCNC: 4.6 G/DL (ref 3.8–4.9)
ALBUMIN/GLOB SERPL: 2.2 {RATIO} (ref 1.2–2.2)
ALP SERPL-CCNC: 103 IU/L (ref 44–121)
ALT SERPL-CCNC: 45 IU/L (ref 0–32)
AST SERPL-CCNC: 23 IU/L (ref 0–40)
BASOPHILS # BLD AUTO: 0 X10E3/UL (ref 0–0.2)
BASOPHILS NFR BLD AUTO: 1 %
BILIRUB SERPL-MCNC: 0.3 MG/DL (ref 0–1.2)
BUN SERPL-MCNC: 15 MG/DL (ref 6–24)
BUN/CREAT SERPL: 22 (ref 9–23)
CALCIUM SERPL-MCNC: 9.7 MG/DL (ref 8.7–10.2)
CHLORIDE SERPL-SCNC: 106 MMOL/L (ref 96–106)
CHOLEST SERPL-MCNC: 251 MG/DL (ref 100–199)
CO2 SERPL-SCNC: 23 MMOL/L (ref 20–29)
CREAT SERPL-MCNC: 0.69 MG/DL (ref 0.57–1)
EGFRCR SERPLBLD CKD-EPI 2021: 101 ML/MIN/1.73
EOSINOPHIL # BLD AUTO: 0.1 X10E3/UL (ref 0–0.4)
EOSINOPHIL NFR BLD AUTO: 2 %
ERYTHROCYTE [DISTWIDTH] IN BLOOD BY AUTOMATED COUNT: 12.5 % (ref 11.7–15.4)
FOLATE SERPL-MCNC: 15.4 NG/ML
GLOBULIN SER CALC-MCNC: 2.1 G/DL (ref 1.5–4.5)
GLUCOSE SERPL-MCNC: 97 MG/DL (ref 65–99)
HCT VFR BLD AUTO: 42.6 % (ref 34–46.6)
HCV AB S/CO SERPL IA: <0.1 S/CO RATIO (ref 0–0.9)
HDLC SERPL-MCNC: 42 MG/DL
HGB BLD-MCNC: 14.4 G/DL (ref 11.1–15.9)
IMM GRANULOCYTES # BLD AUTO: 0 X10E3/UL (ref 0–0.1)
IMM GRANULOCYTES NFR BLD AUTO: 0 %
LDLC SERPL CALC-MCNC: 172 MG/DL (ref 0–99)
LYMPHOCYTES # BLD AUTO: 2.2 X10E3/UL (ref 0.7–3.1)
LYMPHOCYTES NFR BLD AUTO: 38 %
MCH RBC QN AUTO: 31.2 PG (ref 26.6–33)
MCHC RBC AUTO-ENTMCNC: 33.8 G/DL (ref 31.5–35.7)
MCV RBC AUTO: 92 FL (ref 79–97)
MONOCYTES # BLD AUTO: 0.5 X10E3/UL (ref 0.1–0.9)
MONOCYTES NFR BLD AUTO: 9 %
NEUTROPHILS # BLD AUTO: 2.9 X10E3/UL (ref 1.4–7)
NEUTROPHILS NFR BLD AUTO: 50 %
PLATELET # BLD AUTO: 166 X10E3/UL (ref 150–450)
POTASSIUM SERPL-SCNC: 5.1 MMOL/L (ref 3.5–5.2)
PROT SERPL-MCNC: 6.7 G/DL (ref 6–8.5)
RBC # BLD AUTO: 4.61 X10E6/UL (ref 3.77–5.28)
SODIUM SERPL-SCNC: 144 MMOL/L (ref 134–144)
T4 FREE SERPL-MCNC: 1.1 NG/DL (ref 0.82–1.77)
TRIGL SERPL-MCNC: 197 MG/DL (ref 0–149)
TSH SERPL DL<=0.005 MIU/L-ACNC: 0.7 UIU/ML (ref 0.45–4.5)
VIT B12 SERPL-MCNC: 225 PG/ML (ref 232–1245)
VLDLC SERPL CALC-MCNC: 37 MG/DL (ref 5–40)
WBC # BLD AUTO: 5.8 X10E3/UL (ref 3.4–10.8)

## 2022-04-07 ENCOUNTER — TELEPHONE (OUTPATIENT)
Dept: FAMILY MEDICINE CLINIC | Facility: CLINIC | Age: 57
End: 2022-04-07

## 2022-04-07 DIAGNOSIS — E53.8 B12 DEFICIENCY: Primary | ICD-10-CM

## 2022-04-07 RX ORDER — CYANOCOBALAMIN 1000 UG/ML
1000 INJECTION, SOLUTION INTRAMUSCULAR; SUBCUTANEOUS
Status: SHIPPED | OUTPATIENT
Start: 2022-04-14

## 2022-04-07 RX ORDER — ATORVASTATIN CALCIUM 20 MG/1
20 TABLET, FILM COATED ORAL DAILY
Qty: 30 TABLET | Refills: 5 | Status: SHIPPED | OUTPATIENT
Start: 2022-04-07 | End: 2022-09-20 | Stop reason: SDUPTHER

## 2022-04-07 NOTE — TELEPHONE ENCOUNTER
----- Message from DELFINO Moss sent at 4/1/2022  2:56 PM EDT -----  Cholesterol continues to increase.  Need to start atorvastatin 20 mg daily at bedtime to reduce.   B12 is also low.  Need to start monthly B12 injections.

## 2022-04-20 ENCOUNTER — HOSPITAL ENCOUNTER (OUTPATIENT)
Dept: CT IMAGING | Facility: HOSPITAL | Age: 57
Discharge: HOME | End: 2022-04-20
Admitting: NURSE PRACTITIONER

## 2022-04-20 PROCEDURE — 71271 CT THORAX LUNG CANCER SCR C-: CPT

## 2022-04-25 ENCOUNTER — TELEPHONE (OUTPATIENT)
Dept: FAMILY MEDICINE CLINIC | Facility: CLINIC | Age: 57
End: 2022-04-25

## 2022-04-25 DIAGNOSIS — IMO0001 LUNG NODULE < 6CM ON CT: Primary | ICD-10-CM

## 2022-04-25 NOTE — TELEPHONE ENCOUNTER
Caller: Jd Perkins    Relationship: Self    Best call back number: [phone]    Caller requesting test results: SELF     What test was performed: CT SCAN OF CHEST     When was the test performed: 04/20    Where was the test performed: [facility]

## 2022-04-27 ENCOUNTER — CLINICAL SUPPORT (OUTPATIENT)
Dept: FAMILY MEDICINE CLINIC | Facility: CLINIC | Age: 57
End: 2022-04-27

## 2022-04-27 DIAGNOSIS — E53.8 B12 DEFICIENCY: ICD-10-CM

## 2022-04-27 PROCEDURE — 96372 THER/PROPH/DIAG INJ SC/IM: CPT | Performed by: NURSE PRACTITIONER

## 2022-04-27 RX ADMIN — CYANOCOBALAMIN 1000 MCG: 1000 INJECTION, SOLUTION INTRAMUSCULAR; SUBCUTANEOUS at 08:25

## 2022-04-27 NOTE — PROGRESS NOTES
Injection  Injection performed in right arm by Jose Jaimes MA. Patient tolerated the procedure well without complications.  04/27/22   Jose Jaimes MA

## 2022-05-18 ENCOUNTER — OFFICE (AMBULATORY)
Dept: URBAN - METROPOLITAN AREA CLINIC 75 | Facility: CLINIC | Age: 57
End: 2022-05-18

## 2022-05-18 VITALS
SYSTOLIC BLOOD PRESSURE: 118 MMHG | OXYGEN SATURATION: 97 % | WEIGHT: 222.6 LBS | HEIGHT: 68 IN | DIASTOLIC BLOOD PRESSURE: 68 MMHG | HEART RATE: 73 BPM

## 2022-05-18 DIAGNOSIS — Z86.010 PERSONAL HISTORY OF COLONIC POLYPS: ICD-10-CM

## 2022-05-18 DIAGNOSIS — R13.10 DYSPHAGIA, UNSPECIFIED: ICD-10-CM

## 2022-05-18 DIAGNOSIS — K21.9 GASTRO-ESOPHAGEAL REFLUX DISEASE WITHOUT ESOPHAGITIS: ICD-10-CM

## 2022-05-18 PROCEDURE — 99204 OFFICE O/P NEW MOD 45 MIN: CPT | Performed by: INTERNAL MEDICINE

## 2022-05-18 RX ORDER — OMEPRAZOLE 40 MG/1
40 CAPSULE, DELAYED RELEASE ORAL
Qty: 30 | Refills: 11 | Status: ACTIVE

## 2022-05-27 ENCOUNTER — CLINICAL SUPPORT (OUTPATIENT)
Dept: FAMILY MEDICINE CLINIC | Facility: CLINIC | Age: 57
End: 2022-05-27

## 2022-05-27 DIAGNOSIS — E53.8 B12 DEFICIENCY: Primary | ICD-10-CM

## 2022-05-27 PROCEDURE — 96372 THER/PROPH/DIAG INJ SC/IM: CPT | Performed by: NURSE PRACTITIONER

## 2022-05-27 RX ADMIN — CYANOCOBALAMIN 1000 MCG: 1000 INJECTION, SOLUTION INTRAMUSCULAR; SUBCUTANEOUS at 08:20

## 2022-05-27 NOTE — PROGRESS NOTES
Injection  Injection performed in LD by Jacquie Chavez MA. Patient tolerated the procedure well without complications.  05/27/22   Jacquie Chavez MA

## 2022-06-27 ENCOUNTER — CLINICAL SUPPORT (OUTPATIENT)
Dept: FAMILY MEDICINE CLINIC | Facility: CLINIC | Age: 57
End: 2022-06-27

## 2022-06-27 DIAGNOSIS — E53.8 LOW VITAMIN B12 LEVEL: ICD-10-CM

## 2022-06-27 PROCEDURE — 96372 THER/PROPH/DIAG INJ SC/IM: CPT | Performed by: NURSE PRACTITIONER

## 2022-06-27 RX ADMIN — CYANOCOBALAMIN 1000 MCG: 1000 INJECTION, SOLUTION INTRAMUSCULAR; SUBCUTANEOUS at 10:20

## 2022-06-27 NOTE — PROGRESS NOTES
Injection  Injection performed in RD by Filemon Viera MA. Patient tolerated the procedure well without complications.  06/27/22   Filemon Viera MA

## 2022-07-13 VITALS
HEART RATE: 52 BPM | HEART RATE: 77 BPM | HEART RATE: 62 BPM | OXYGEN SATURATION: 100 % | SYSTOLIC BLOOD PRESSURE: 112 MMHG | RESPIRATION RATE: 20 BRPM | DIASTOLIC BLOOD PRESSURE: 57 MMHG | HEART RATE: 64 BPM | RESPIRATION RATE: 22 BRPM | HEART RATE: 67 BPM | HEART RATE: 63 BPM | RESPIRATION RATE: 19 BRPM | DIASTOLIC BLOOD PRESSURE: 63 MMHG | HEART RATE: 65 BPM | DIASTOLIC BLOOD PRESSURE: 59 MMHG | OXYGEN SATURATION: 96 % | RESPIRATION RATE: 24 BRPM | HEART RATE: 60 BPM | RESPIRATION RATE: 14 BRPM | SYSTOLIC BLOOD PRESSURE: 118 MMHG | OXYGEN SATURATION: 98 % | SYSTOLIC BLOOD PRESSURE: 125 MMHG | SYSTOLIC BLOOD PRESSURE: 139 MMHG | HEART RATE: 68 BPM | RESPIRATION RATE: 17 BRPM | DIASTOLIC BLOOD PRESSURE: 94 MMHG | SYSTOLIC BLOOD PRESSURE: 142 MMHG | DIASTOLIC BLOOD PRESSURE: 64 MMHG | SYSTOLIC BLOOD PRESSURE: 120 MMHG | SYSTOLIC BLOOD PRESSURE: 121 MMHG | SYSTOLIC BLOOD PRESSURE: 110 MMHG | RESPIRATION RATE: 16 BRPM | TEMPERATURE: 97.9 F | DIASTOLIC BLOOD PRESSURE: 65 MMHG | RESPIRATION RATE: 21 BRPM | OXYGEN SATURATION: 95 % | TEMPERATURE: 97.2 F | DIASTOLIC BLOOD PRESSURE: 58 MMHG | WEIGHT: 222 LBS | SYSTOLIC BLOOD PRESSURE: 109 MMHG | DIASTOLIC BLOOD PRESSURE: 66 MMHG | RESPIRATION RATE: 18 BRPM | HEIGHT: 68 IN | SYSTOLIC BLOOD PRESSURE: 119 MMHG | OXYGEN SATURATION: 97 %

## 2022-07-15 ENCOUNTER — OFFICE (AMBULATORY)
Dept: URBAN - METROPOLITAN AREA PATHOLOGY 4 | Facility: PATHOLOGY | Age: 57
End: 2022-07-15

## 2022-07-15 ENCOUNTER — AMBULATORY SURGICAL CENTER (AMBULATORY)
Dept: URBAN - METROPOLITAN AREA SURGERY 17 | Facility: SURGERY | Age: 57
End: 2022-07-15
Payer: COMMERCIAL

## 2022-07-15 DIAGNOSIS — K21.9 GASTRO-ESOPHAGEAL REFLUX DISEASE WITHOUT ESOPHAGITIS: ICD-10-CM

## 2022-07-15 DIAGNOSIS — R13.10 DYSPHAGIA, UNSPECIFIED: ICD-10-CM

## 2022-07-15 DIAGNOSIS — D12.0 BENIGN NEOPLASM OF CECUM: ICD-10-CM

## 2022-07-15 DIAGNOSIS — D12.2 BENIGN NEOPLASM OF ASCENDING COLON: ICD-10-CM

## 2022-07-15 DIAGNOSIS — K63.5 POLYP OF COLON: ICD-10-CM

## 2022-07-15 DIAGNOSIS — Z86.010 PERSONAL HISTORY OF COLONIC POLYPS: ICD-10-CM

## 2022-07-15 DIAGNOSIS — D12.3 BENIGN NEOPLASM OF TRANSVERSE COLON: ICD-10-CM

## 2022-07-15 LAB
GI HISTOLOGY: A. UNSPECIFIED: (no result)
GI HISTOLOGY: B. UNSPECIFIED: (no result)
GI HISTOLOGY: C. SELECT: (no result)
GI HISTOLOGY: D. SELECT: (no result)
GI HISTOLOGY: E. SELECT: (no result)
GI HISTOLOGY: PDF REPORT: (no result)

## 2022-07-15 PROCEDURE — 88305 TISSUE EXAM BY PATHOLOGIST: CPT | Performed by: INTERNAL MEDICINE

## 2022-07-15 PROCEDURE — 43239 EGD BIOPSY SINGLE/MULTIPLE: CPT | Performed by: INTERNAL MEDICINE

## 2022-07-15 PROCEDURE — 45380 COLONOSCOPY AND BIOPSY: CPT | Mod: 59,33 | Performed by: INTERNAL MEDICINE

## 2022-07-15 PROCEDURE — 45380 COLONOSCOPY AND BIOPSY: CPT | Mod: 33,59 | Performed by: INTERNAL MEDICINE

## 2022-07-15 PROCEDURE — 45385 COLONOSCOPY W/LESION REMOVAL: CPT | Mod: 33 | Performed by: INTERNAL MEDICINE

## 2022-07-25 ENCOUNTER — CLINICAL SUPPORT (OUTPATIENT)
Dept: FAMILY MEDICINE CLINIC | Facility: CLINIC | Age: 57
End: 2022-07-25

## 2022-07-25 DIAGNOSIS — E53.8 LOW SERUM VITAMIN B12: ICD-10-CM

## 2022-07-25 PROCEDURE — 96372 THER/PROPH/DIAG INJ SC/IM: CPT | Performed by: NURSE PRACTITIONER

## 2022-07-25 RX ADMIN — CYANOCOBALAMIN 1000 MCG: 1000 INJECTION, SOLUTION INTRAMUSCULAR; SUBCUTANEOUS at 12:00

## 2022-07-25 NOTE — PROGRESS NOTES
Injection  Injection performed in RD by Filemon Viera MA. Patient tolerated the procedure well without complications.  07/25/22   Filemon Viera MA

## 2022-08-25 ENCOUNTER — CLINICAL SUPPORT (OUTPATIENT)
Dept: FAMILY MEDICINE CLINIC | Facility: CLINIC | Age: 57
End: 2022-08-25

## 2022-08-25 DIAGNOSIS — E53.8 B12 DEFICIENCY: ICD-10-CM

## 2022-08-25 PROCEDURE — 96372 THER/PROPH/DIAG INJ SC/IM: CPT | Performed by: NURSE PRACTITIONER

## 2022-08-25 RX ADMIN — CYANOCOBALAMIN 1000 MCG: 1000 INJECTION, SOLUTION INTRAMUSCULAR; SUBCUTANEOUS at 08:58

## 2022-08-25 NOTE — PROGRESS NOTES
Injection  Injection performed in LD by Filemon Viera MA. Patient tolerated the procedure well without complications.  08/25/22   Filemon Viera MA

## 2022-09-12 DIAGNOSIS — K21.9 GASTROESOPHAGEAL REFLUX DISEASE, UNSPECIFIED WHETHER ESOPHAGITIS PRESENT: ICD-10-CM

## 2022-09-12 RX ORDER — OMEPRAZOLE 40 MG/1
CAPSULE, DELAYED RELEASE ORAL
Qty: 30 CAPSULE | Refills: 0 | Status: SHIPPED | OUTPATIENT
Start: 2022-09-12 | End: 2022-09-20 | Stop reason: SDUPTHER

## 2022-09-20 ENCOUNTER — TELEPHONE (OUTPATIENT)
Dept: FAMILY MEDICINE CLINIC | Facility: CLINIC | Age: 57
End: 2022-09-20

## 2022-09-20 ENCOUNTER — OFFICE VISIT (OUTPATIENT)
Dept: FAMILY MEDICINE CLINIC | Facility: CLINIC | Age: 57
End: 2022-09-20

## 2022-09-20 VITALS
BODY MASS INDEX: 33.8 KG/M2 | HEIGHT: 68 IN | HEART RATE: 68 BPM | DIASTOLIC BLOOD PRESSURE: 60 MMHG | TEMPERATURE: 98.3 F | WEIGHT: 223 LBS | OXYGEN SATURATION: 98 % | RESPIRATION RATE: 16 BRPM | SYSTOLIC BLOOD PRESSURE: 108 MMHG

## 2022-09-20 DIAGNOSIS — E53.8 B12 DEFICIENCY: Primary | ICD-10-CM

## 2022-09-20 DIAGNOSIS — K21.9 GASTROESOPHAGEAL REFLUX DISEASE, UNSPECIFIED WHETHER ESOPHAGITIS PRESENT: Primary | ICD-10-CM

## 2022-09-20 DIAGNOSIS — E53.8 LOW VITAMIN B12 LEVEL: ICD-10-CM

## 2022-09-20 DIAGNOSIS — K21.9 GASTROESOPHAGEAL REFLUX DISEASE, UNSPECIFIED WHETHER ESOPHAGITIS PRESENT: ICD-10-CM

## 2022-09-20 PROCEDURE — 99213 OFFICE O/P EST LOW 20 MIN: CPT | Performed by: NURSE PRACTITIONER

## 2022-09-20 PROCEDURE — 96372 THER/PROPH/DIAG INJ SC/IM: CPT | Performed by: NURSE PRACTITIONER

## 2022-09-20 RX ORDER — OMEPRAZOLE 40 MG/1
40 CAPSULE, DELAYED RELEASE ORAL DAILY
Qty: 90 CAPSULE | Refills: 3 | Status: SHIPPED | OUTPATIENT
Start: 2022-09-20

## 2022-09-20 RX ORDER — CYANOCOBALAMIN 1000 UG/ML
1000 INJECTION, SOLUTION INTRAMUSCULAR; SUBCUTANEOUS ONCE
Status: COMPLETED | OUTPATIENT
Start: 2022-09-20 | End: 2022-09-20

## 2022-09-20 RX ORDER — ATORVASTATIN CALCIUM 20 MG/1
20 TABLET, FILM COATED ORAL DAILY
Qty: 90 TABLET | Refills: 3 | Status: SHIPPED | OUTPATIENT
Start: 2022-09-20

## 2022-09-20 RX ADMIN — CYANOCOBALAMIN 1000 MCG: 1000 INJECTION, SOLUTION INTRAMUSCULAR; SUBCUTANEOUS at 09:43

## 2022-09-20 NOTE — PROGRESS NOTES
"Subjective   Jd Perkins is a 57 y.o. female.     History of Present Illness    Since the last visit, she has overall felt well.  She has GERD controlled on PPI Rx and Hyperlipidemia with goals met with current Rx.  she has been compliant with current medications have reviewed them.  The patient denies medication side effects.  Will refill medications. /60   Pulse 68   Temp 98.3 °F (36.8 °C)   Resp 16   Ht 172.7 cm (68\")   Wt 101 kg (223 lb)   SpO2 98%   BMI 33.91 kg/m²     Results for orders placed or performed in visit on 03/30/22   Comprehensive metabolic panel    Specimen: Blood   Result Value Ref Range    Glucose 97 65 - 99 mg/dL    BUN 15 6 - 24 mg/dL    Creatinine 0.69 0.57 - 1.00 mg/dL    EGFR Result 101 >59 mL/min/1.73    BUN/Creatinine Ratio 22 9 - 23    Sodium 144 134 - 144 mmol/L    Potassium 5.1 3.5 - 5.2 mmol/L    Chloride 106 96 - 106 mmol/L    Total CO2 23 20 - 29 mmol/L    Calcium 9.7 8.7 - 10.2 mg/dL    Total Protein 6.7 6.0 - 8.5 g/dL    Albumin 4.6 3.8 - 4.9 g/dL    Globulin 2.1 1.5 - 4.5 g/dL    A/G Ratio 2.2 1.2 - 2.2    Total Bilirubin 0.3 0.0 - 1.2 mg/dL    Alkaline Phosphatase 103 44 - 121 IU/L    AST (SGOT) 23 0 - 40 IU/L    ALT (SGPT) 45 (H) 0 - 32 IU/L   Lipid panel    Specimen: Blood   Result Value Ref Range    Total Cholesterol 251 (H) 100 - 199 mg/dL    Triglycerides 197 (H) 0 - 149 mg/dL    HDL Cholesterol 42 >39 mg/dL    VLDL Cholesterol Bernardo 37 5 - 40 mg/dL    LDL Chol Calc (NIH) 172 (H) 0 - 99 mg/dL   TSH    Specimen: Blood   Result Value Ref Range    TSH 0.696 0.450 - 4.500 uIU/mL   T4, free    Specimen: Blood   Result Value Ref Range    Free T4 1.10 0.82 - 1.77 ng/dL   Vitamin D 25 Hydroxy    Specimen: Blood   Result Value Ref Range    25 Hydroxy, Vitamin D 65.6 30.0 - 100.0 ng/mL   Vitamin B12    Specimen: Blood   Result Value Ref Range    Vitamin B-12 225 (L) 232 - 1,245 pg/mL   Folate    Specimen: Blood   Result Value Ref Range    Folate 15.4 >3.0 ng/mL "   Hepatitis C Antibody   Result Value Ref Range    Hep C Virus Ab <0.1 0.0 - 0.9 s/co ratio   CBC and Differential    Specimen: Blood   Result Value Ref Range    WBC 5.8 3.4 - 10.8 x10E3/uL    RBC 4.61 3.77 - 5.28 x10E6/uL    Hemoglobin 14.4 11.1 - 15.9 g/dL    Hematocrit 42.6 34.0 - 46.6 %    MCV 92 79 - 97 fL    MCH 31.2 26.6 - 33.0 pg    MCHC 33.8 31.5 - 35.7 g/dL    RDW 12.5 11.7 - 15.4 %    Platelets 166 150 - 450 x10E3/uL    Neutrophil Rel % 50 Not Estab. %    Lymphocyte Rel % 38 Not Estab. %    Monocyte Rel % 9 Not Estab. %    Eosinophil Rel % 2 Not Estab. %    Basophil Rel % 1 Not Estab. %    Neutrophils Absolute 2.9 1.4 - 7.0 x10E3/uL    Lymphocytes Absolute 2.2 0.7 - 3.1 x10E3/uL    Monocytes Absolute 0.5 0.1 - 0.9 x10E3/uL    Eosinophils Absolute 0.1 0.0 - 0.4 x10E3/uL    Basophils Absolute 0.0 0.0 - 0.2 x10E3/uL    Immature Granulocyte Rel % 0 Not Estab. %    Immature Grans Absolute 0.0 0.0 - 0.1 x10E3/uL       Had labs drawn this morning.   The following portions of the patient's history were reviewed and updated as appropriate: allergies, current medications, past family history, past medical history, past social history, past surgical history and problem list.    Review of Systems   Constitutional: Negative for unexpected weight change.   Respiratory: Negative for shortness of breath.    Cardiovascular: Negative for chest pain and palpitations.   Endocrine: Negative for cold intolerance, heat intolerance, polydipsia, polyphagia and polyuria.   Psychiatric/Behavioral: Negative for behavioral problems.       Objective   Physical Exam  Vitals and nursing note reviewed.   Constitutional:       Appearance: Normal appearance. She is well-developed.   Neck:      Vascular: No carotid bruit.   Cardiovascular:      Rate and Rhythm: Normal rate and regular rhythm.   Pulmonary:      Effort: Pulmonary effort is normal.      Breath sounds: Normal breath sounds.   Neurological:      Mental Status: She is alert and  oriented to person, place, and time.   Psychiatric:         Mood and Affect: Mood normal.         Behavior: Behavior normal.         Thought Content: Thought content normal.         Judgment: Judgment normal.         Assessment & Plan   Diagnoses and all orders for this visit:    1. B12 deficiency (Primary)  -     cyanocobalamin injection 1,000 mcg    2. Gastroesophageal reflux disease, unspecified whether esophagitis present  -     omeprazole (priLOSEC) 40 MG capsule; Take 1 capsule by mouth Daily. for GERD  Dispense: 90 capsule; Refill: 3    Other orders  -     atorvastatin (LIPITOR) 20 MG tablet; Take 1 tablet by mouth Daily. Take at bedtime  Dispense: 90 tablet; Refill: 3          Will transition to oral B12.

## 2022-09-20 NOTE — PROGRESS NOTES
Injection  Injection performed in Left Deltoid by Jaja Rm. Patient tolerated the procedure well without complications.  09/20/22   Jaja Rm

## 2022-09-21 LAB
ALBUMIN SERPL-MCNC: 4.7 G/DL (ref 3.8–4.9)
ALBUMIN/GLOB SERPL: 2.4 {RATIO} (ref 1.2–2.2)
ALP SERPL-CCNC: 115 IU/L (ref 44–121)
ALT SERPL-CCNC: 56 IU/L (ref 0–32)
AST SERPL-CCNC: 32 IU/L (ref 0–40)
BASOPHILS # BLD AUTO: 0.1 X10E3/UL (ref 0–0.2)
BASOPHILS NFR BLD AUTO: 1 %
BILIRUB SERPL-MCNC: 0.3 MG/DL (ref 0–1.2)
BUN SERPL-MCNC: 16 MG/DL (ref 6–24)
BUN/CREAT SERPL: 20 (ref 9–23)
CALCIUM SERPL-MCNC: 9.6 MG/DL (ref 8.7–10.2)
CHLORIDE SERPL-SCNC: 105 MMOL/L (ref 96–106)
CHOLEST SERPL-MCNC: 166 MG/DL (ref 100–199)
CO2 SERPL-SCNC: 23 MMOL/L (ref 20–29)
CREAT SERPL-MCNC: 0.8 MG/DL (ref 0.57–1)
EGFRCR SERPLBLD CKD-EPI 2021: 85 ML/MIN/1.73
EOSINOPHIL # BLD AUTO: 0.1 X10E3/UL (ref 0–0.4)
EOSINOPHIL NFR BLD AUTO: 2 %
ERYTHROCYTE [DISTWIDTH] IN BLOOD BY AUTOMATED COUNT: 12.9 % (ref 11.7–15.4)
GLOBULIN SER CALC-MCNC: 2 G/DL (ref 1.5–4.5)
GLUCOSE SERPL-MCNC: 114 MG/DL (ref 65–99)
HCT VFR BLD AUTO: 43.6 % (ref 34–46.6)
HDLC SERPL-MCNC: 38 MG/DL
HGB BLD-MCNC: 14.9 G/DL (ref 11.1–15.9)
IMM GRANULOCYTES # BLD AUTO: 0 X10E3/UL (ref 0–0.1)
IMM GRANULOCYTES NFR BLD AUTO: 0 %
LDLC SERPL CALC-MCNC: 93 MG/DL (ref 0–99)
LYMPHOCYTES # BLD AUTO: 1.9 X10E3/UL (ref 0.7–3.1)
LYMPHOCYTES NFR BLD AUTO: 33 %
MCH RBC QN AUTO: 31.2 PG (ref 26.6–33)
MCHC RBC AUTO-ENTMCNC: 34.2 G/DL (ref 31.5–35.7)
MCV RBC AUTO: 91 FL (ref 79–97)
MONOCYTES # BLD AUTO: 0.4 X10E3/UL (ref 0.1–0.9)
MONOCYTES NFR BLD AUTO: 8 %
NEUTROPHILS # BLD AUTO: 3.2 X10E3/UL (ref 1.4–7)
NEUTROPHILS NFR BLD AUTO: 56 %
PLATELET # BLD AUTO: 167 X10E3/UL (ref 150–450)
POTASSIUM SERPL-SCNC: 4.7 MMOL/L (ref 3.5–5.2)
PROT SERPL-MCNC: 6.7 G/DL (ref 6–8.5)
RBC # BLD AUTO: 4.78 X10E6/UL (ref 3.77–5.28)
SODIUM SERPL-SCNC: 143 MMOL/L (ref 134–144)
TRIGL SERPL-MCNC: 202 MG/DL (ref 0–149)
TSH SERPL DL<=0.005 MIU/L-ACNC: 1.13 UIU/ML (ref 0.45–4.5)
VLDLC SERPL CALC-MCNC: 35 MG/DL (ref 5–40)
WBC # BLD AUTO: 5.6 X10E3/UL (ref 3.4–10.8)

## 2023-02-28 ENCOUNTER — OFFICE VISIT (OUTPATIENT)
Dept: FAMILY MEDICINE CLINIC | Facility: CLINIC | Age: 58
End: 2023-02-28
Payer: COMMERCIAL

## 2023-02-28 VITALS
SYSTOLIC BLOOD PRESSURE: 110 MMHG | BODY MASS INDEX: 34.1 KG/M2 | WEIGHT: 225 LBS | TEMPERATURE: 98.2 F | RESPIRATION RATE: 16 BRPM | HEIGHT: 68 IN | HEART RATE: 78 BPM | DIASTOLIC BLOOD PRESSURE: 65 MMHG

## 2023-02-28 DIAGNOSIS — J01.00 ACUTE NON-RECURRENT MAXILLARY SINUSITIS: Primary | ICD-10-CM

## 2023-02-28 LAB
EXPIRATION DATE: NORMAL
FLUAV AG UPPER RESP QL IA.RAPID: NOT DETECTED
FLUBV AG UPPER RESP QL IA.RAPID: NOT DETECTED
INTERNAL CONTROL: NORMAL
Lab: NORMAL
SARS-COV-2 AG UPPER RESP QL IA.RAPID: NOT DETECTED

## 2023-02-28 PROCEDURE — 99213 OFFICE O/P EST LOW 20 MIN: CPT | Performed by: FAMILY MEDICINE

## 2023-02-28 PROCEDURE — 87428 SARSCOV & INF VIR A&B AG IA: CPT | Performed by: FAMILY MEDICINE

## 2023-02-28 RX ORDER — DOXYCYCLINE HYCLATE 100 MG
100 TABLET ORAL 2 TIMES DAILY
Qty: 20 TABLET | Refills: 0 | Status: SHIPPED | OUTPATIENT
Start: 2023-02-28

## 2023-02-28 NOTE — PROGRESS NOTES
"Subjective   Jd Perkins is a 57 y.o. female.     CC: URI-Sx    History of Present Illness     Pt of [facility] comes to see me today reporting 5 day h/o facial p/p, congestion, ST (gone now), cough (productive), no f/c.       The following portions of the patient's history were reviewed and updated as appropriate: allergies, current medications, past family history, past medical history, past social history, past surgical history and problem list.    Review of Systems   Constitutional: Negative for activity change, chills and fever.   HENT: Positive for congestion and sinus pressure.    Respiratory: Positive for cough.    Cardiovascular: Negative for chest pain.   Psychiatric/Behavioral: Negative for dysphoric mood.       /65   Pulse 78   Temp 98.2 °F (36.8 °C) (Oral)   Resp 16   Ht 172.7 cm (68\")   Wt 102 kg (225 lb)   BMI 34.21 kg/m²     Objective   Physical Exam  Constitutional:       General: She is not in acute distress.     Appearance: She is well-developed.   HENT:      Right Ear: Tympanic membrane and ear canal normal.      Left Ear: Tympanic membrane and ear canal normal.      Nose:      Right Sinus: Maxillary sinus tenderness present.      Left Sinus: Maxillary sinus tenderness present.   Cardiovascular:      Rate and Rhythm: Normal rate and regular rhythm.   Pulmonary:      Effort: Pulmonary effort is normal.      Breath sounds: Normal breath sounds.   Neurological:      Mental Status: She is alert and oriented to person, place, and time.   Psychiatric:         Behavior: Behavior normal.         Thought Content: Thought content normal.     COVID/Flu RAST: NEGATIVE    Assessment & Plan   Diagnoses and all orders for this visit:    1. Acute non-recurrent maxillary sinusitis (Primary)  -     POCT SARS-CoV-2 Antigen TALHA + Flu  -     doxycycline (VIBRAMYICN) 100 MG tablet; Take 1 tablet by mouth 2 (Two) Times a Day.  Dispense: 20 tablet; Refill: 0               "

## 2023-04-18 ENCOUNTER — HOSPITAL ENCOUNTER (OUTPATIENT)
Dept: CT IMAGING | Facility: HOSPITAL | Age: 58
Discharge: HOME | End: 2023-04-18
Admitting: NURSE PRACTITIONER
Payer: COMMERCIAL

## 2023-04-18 DIAGNOSIS — IMO0001 LUNG NODULE < 6CM ON CT: ICD-10-CM

## 2023-04-18 PROCEDURE — 71250 CT THORAX DX C-: CPT

## 2023-09-29 DIAGNOSIS — K21.9 GASTROESOPHAGEAL REFLUX DISEASE, UNSPECIFIED WHETHER ESOPHAGITIS PRESENT: ICD-10-CM

## 2023-09-29 RX ORDER — ATORVASTATIN CALCIUM 20 MG/1
20 TABLET, FILM COATED ORAL DAILY
Qty: 30 TABLET | Refills: 0 | Status: SHIPPED | OUTPATIENT
Start: 2023-09-29

## 2023-09-29 RX ORDER — OMEPRAZOLE 40 MG/1
40 CAPSULE, DELAYED RELEASE ORAL DAILY
Qty: 30 CAPSULE | Refills: 0 | Status: SHIPPED | OUTPATIENT
Start: 2023-09-29

## 2023-10-06 DIAGNOSIS — K21.9 GASTROESOPHAGEAL REFLUX DISEASE, UNSPECIFIED WHETHER ESOPHAGITIS PRESENT: ICD-10-CM

## 2023-10-06 RX ORDER — OMEPRAZOLE 40 MG/1
40 CAPSULE, DELAYED RELEASE ORAL DAILY
Qty: 30 CAPSULE | Refills: 0 | Status: SHIPPED | OUTPATIENT
Start: 2023-10-06

## 2023-10-06 RX ORDER — ATORVASTATIN CALCIUM 20 MG/1
20 TABLET, FILM COATED ORAL DAILY
Qty: 30 TABLET | Refills: 0 | Status: SHIPPED | OUTPATIENT
Start: 2023-10-06

## 2023-11-07 ENCOUNTER — OFFICE VISIT (OUTPATIENT)
Dept: FAMILY MEDICINE CLINIC | Facility: CLINIC | Age: 58
End: 2023-11-07
Payer: COMMERCIAL

## 2023-11-07 VITALS
SYSTOLIC BLOOD PRESSURE: 120 MMHG | OXYGEN SATURATION: 98 % | HEART RATE: 64 BPM | HEIGHT: 68 IN | DIASTOLIC BLOOD PRESSURE: 74 MMHG | RESPIRATION RATE: 16 BRPM | BODY MASS INDEX: 34.25 KG/M2 | WEIGHT: 226 LBS

## 2023-11-07 DIAGNOSIS — J01.80 OTHER ACUTE SINUSITIS, RECURRENCE NOT SPECIFIED: ICD-10-CM

## 2023-11-07 DIAGNOSIS — K21.9 GASTROESOPHAGEAL REFLUX DISEASE, UNSPECIFIED WHETHER ESOPHAGITIS PRESENT: ICD-10-CM

## 2023-11-07 DIAGNOSIS — E78.2 MIXED HYPERLIPIDEMIA: ICD-10-CM

## 2023-11-07 DIAGNOSIS — E55.9 VITAMIN D DEFICIENCY: Primary | ICD-10-CM

## 2023-11-07 PROCEDURE — 99214 OFFICE O/P EST MOD 30 MIN: CPT | Performed by: NURSE PRACTITIONER

## 2023-11-07 RX ORDER — DOXYCYCLINE 100 MG/1
100 TABLET ORAL 2 TIMES DAILY
Qty: 20 TABLET | Refills: 0 | Status: SHIPPED | OUTPATIENT
Start: 2023-11-07

## 2023-11-07 RX ORDER — ATORVASTATIN CALCIUM 20 MG/1
20 TABLET, FILM COATED ORAL NIGHTLY
Qty: 90 TABLET | Refills: 3 | Status: SHIPPED | OUTPATIENT
Start: 2023-11-07

## 2023-11-07 RX ORDER — OMEPRAZOLE 40 MG/1
40 CAPSULE, DELAYED RELEASE ORAL DAILY
Qty: 90 CAPSULE | Refills: 3 | Status: SHIPPED | OUTPATIENT
Start: 2023-11-07

## 2023-11-07 NOTE — PROGRESS NOTES
"Viet Perkins is a 58 y.o. female.     History of Present Illness    Since the last visit, she has overall felt well until recent illness.  She has GERD controlled on PPI Rx and Hyperlipidemia with goals met with current Rx.  she has been compliant with current medications have reviewed them.  The patient denies medication side effects.  Will refill medications. /74   Pulse 64   Resp 16   Ht 172.7 cm (68\")   Wt 103 kg (226 lb)   SpO2 98%   BMI 34.36 kg/m²     Results for orders placed or performed in visit on 02/28/23   POCT SARS-CoV-2 Antigen TALHA + Flu    Specimen: Swab   Result Value Ref Range    SARS Antigen Not Detected Not Detected, Presumptive Negative    Influenza A Antigen TALHA Not Detected Not Detected    Influenza B Antigen TALHA Not Detected Not Detected    Internal Control Passed Passed    Lot Number [id number]     Expiration Date 06/01/2023      Due for labs.     The following portions of the patient's history were reviewed and updated as appropriate: allergies, current medications, past family history, past medical history, past social history, past surgical history, and problem list.    Review of Systems   Constitutional:  Positive for fatigue. Negative for chills, fever and unexpected weight change.   HENT:  Positive for congestion, postnasal drip, sinus pressure and sinus pain. Negative for ear pain.    Respiratory:  Positive for cough and wheezing. Negative for shortness of breath.    Cardiovascular:  Negative for chest pain and palpitations.   Psychiatric/Behavioral:  Negative for behavioral problems.        Objective   Physical Exam  Vitals and nursing note reviewed.   Constitutional:       Appearance: She is well-developed.   HENT:      Right Ear: Tympanic membrane, ear canal and external ear normal.      Left Ear: Tympanic membrane, ear canal and external ear normal.      Nose: Mucosal edema present. No rhinorrhea.      Mouth/Throat:      Lips: Pink.      Mouth: Mucous " membranes are moist.      Pharynx: Posterior oropharyngeal erythema present. No pharyngeal swelling or oropharyngeal exudate.   Cardiovascular:      Rate and Rhythm: Normal rate and regular rhythm.   Pulmonary:      Effort: Pulmonary effort is normal.      Breath sounds: Normal breath sounds.   Neurological:      Mental Status: She is alert and oriented to person, place, and time.   Psychiatric:         Mood and Affect: Mood normal.         Behavior: Behavior normal.         Thought Content: Thought content normal.         Judgment: Judgment normal.         Assessment & Plan   Diagnoses and all orders for this visit:    1. Vitamin D deficiency (Primary)  -     Comprehensive metabolic panel  -     Lipid panel  -     CBC and Differential  -     TSH  -     Vitamin D 25 hydroxy    2. Gastroesophageal reflux disease, unspecified whether esophagitis present  -     omeprazole (priLOSEC) 40 MG capsule; Take 1 capsule by mouth Daily. for GERD  Dispense: 90 capsule; Refill: 3  -     Comprehensive metabolic panel  -     Lipid panel  -     CBC and Differential  -     TSH  -     Vitamin D 25 hydroxy    3. Mixed hyperlipidemia  -     Comprehensive metabolic panel  -     Lipid panel  -     CBC and Differential  -     TSH  -     Vitamin D 25 hydroxy    4. Other acute sinusitis, recurrence not specified  -     doxycycline (ADOXA) 100 MG tablet; Take 1 tablet by mouth 2 (Two) Times a Day.  Dispense: 20 tablet; Refill: 0    Other orders  -     atorvastatin (LIPITOR) 20 MG tablet; Take 1 tablet by mouth Every Night. Take at bedtime  Dispense: 90 tablet; Refill: 3

## 2023-11-08 LAB
25(OH)D3+25(OH)D2 SERPL-MCNC: 28.1 NG/ML (ref 30–100)
ALBUMIN SERPL-MCNC: 5 G/DL (ref 3.5–5.2)
ALBUMIN/GLOB SERPL: 2.8 G/DL
ALP SERPL-CCNC: 95 U/L (ref 39–117)
ALT SERPL-CCNC: 68 U/L (ref 1–33)
AST SERPL-CCNC: 38 U/L (ref 1–32)
BASOPHILS # BLD AUTO: 0.05 10*3/MM3 (ref 0–0.2)
BASOPHILS NFR BLD AUTO: 0.8 % (ref 0–1.5)
BILIRUB SERPL-MCNC: 0.5 MG/DL (ref 0–1.2)
BUN SERPL-MCNC: 16 MG/DL (ref 8–23)
BUN/CREAT SERPL: 22.9 (ref 7–25)
CALCIUM SERPL-MCNC: 9.9 MG/DL (ref 8.6–10.5)
CHLORIDE SERPL-SCNC: 104 MMOL/L (ref 98–107)
CHOLEST SERPL-MCNC: 212 MG/DL (ref 0–200)
CO2 SERPL-SCNC: 29 MMOL/L (ref 22–29)
CREAT SERPL-MCNC: 0.7 MG/DL (ref 0.57–1)
EGFRCR SERPLBLD CKD-EPI 2021: 99.2 ML/MIN/1.73
EOSINOPHIL # BLD AUTO: 0.13 10*3/MM3 (ref 0–0.4)
EOSINOPHIL NFR BLD AUTO: 2 % (ref 0.3–6.2)
ERYTHROCYTE [DISTWIDTH] IN BLOOD BY AUTOMATED COUNT: 12.7 % (ref 12.3–15.4)
GLOBULIN SER CALC-MCNC: 1.8 GM/DL
GLUCOSE SERPL-MCNC: 91 MG/DL (ref 65–99)
HCT VFR BLD AUTO: 44.7 % (ref 34–46.6)
HDLC SERPL-MCNC: 39 MG/DL (ref 40–60)
HGB BLD-MCNC: 15.2 G/DL (ref 12–15.9)
IMM GRANULOCYTES # BLD AUTO: 0.04 10*3/MM3 (ref 0–0.05)
IMM GRANULOCYTES NFR BLD AUTO: 0.6 % (ref 0–0.5)
LDLC SERPL CALC-MCNC: 129 MG/DL (ref 0–100)
LYMPHOCYTES # BLD AUTO: 2.42 10*3/MM3 (ref 0.7–3.1)
LYMPHOCYTES NFR BLD AUTO: 37.6 % (ref 19.6–45.3)
MCH RBC QN AUTO: 31.5 PG (ref 26.6–33)
MCHC RBC AUTO-ENTMCNC: 34 G/DL (ref 31.5–35.7)
MCV RBC AUTO: 92.5 FL (ref 79–97)
MONOCYTES # BLD AUTO: 0.43 10*3/MM3 (ref 0.1–0.9)
MONOCYTES NFR BLD AUTO: 6.7 % (ref 5–12)
NEUTROPHILS # BLD AUTO: 3.36 10*3/MM3 (ref 1.7–7)
NEUTROPHILS NFR BLD AUTO: 52.3 % (ref 42.7–76)
NRBC BLD AUTO-RTO: 0 /100 WBC (ref 0–0.2)
PLATELET # BLD AUTO: 183 10*3/MM3 (ref 140–450)
POTASSIUM SERPL-SCNC: 4.4 MMOL/L (ref 3.5–5.2)
PROT SERPL-MCNC: 6.8 G/DL (ref 6–8.5)
RBC # BLD AUTO: 4.83 10*6/MM3 (ref 3.77–5.28)
SODIUM SERPL-SCNC: 140 MMOL/L (ref 136–145)
TRIGL SERPL-MCNC: 245 MG/DL (ref 0–150)
TSH SERPL DL<=0.005 MIU/L-ACNC: 0.78 UIU/ML (ref 0.27–4.2)
VLDLC SERPL CALC-MCNC: 44 MG/DL (ref 5–40)
WBC # BLD AUTO: 6.43 10*3/MM3 (ref 3.4–10.8)

## 2024-05-14 ENCOUNTER — TELEPHONE (OUTPATIENT)
Dept: FAMILY MEDICINE CLINIC | Facility: CLINIC | Age: 59
End: 2024-05-14
Payer: COMMERCIAL

## 2024-05-14 DIAGNOSIS — Z87.891 HISTORY OF NICOTINE DEPENDENCE: Primary | ICD-10-CM

## 2024-05-14 NOTE — TELEPHONE ENCOUNTER
Caller: Jd Perkins    Relationship: Self    Best call back number: [phone]     What orders are you requesting (i.e. lab or imaging): LUNG CANCER SCREENING    Where will you receive your lab/imaging services: [facility]    Additional notes: PATIENT STATED SHE HAS RECEIVED LETTERS FROM [facility] STATING SHE IS DUE FOR LUNG CANCER SCREENING.    PATIENT STATED THE LETTER ADVISED SHE MUST MAKE SURE ORDERS ARE ENTERED PRIOR TO SCHEDULING.    PATIENT IS REQUESTING TO KNOW IF SHE ,MUST BE SEEN BY [facility] FOR THIS, AND IF THERE ARE ANY LOCATIONS WITH [facility] IN OR CLOSER TO [place] AS THIS WOULD BE CLOSER FOR HER..

## 2024-05-14 NOTE — TELEPHONE ENCOUNTER
I am not sure if it can be done at [place] but she can request this when she is called for scheduling.  I have placed the order.

## 2024-05-14 NOTE — TELEPHONE ENCOUNTER
Spoke with patient to notified her that her order had been place as requested per her provider   I am not sure if it can be done at   [place] but she can request this when   she is called for scheduling.  I have placed   the order.    Patient verbalized understanding

## 2024-05-30 ENCOUNTER — HOSPITAL ENCOUNTER (OUTPATIENT)
Dept: CT IMAGING | Facility: HOSPITAL | Age: 59
Discharge: HOME | End: 2024-05-30
Admitting: NURSE PRACTITIONER
Payer: COMMERCIAL

## 2024-05-30 PROCEDURE — 71271 CT THORAX LUNG CANCER SCR C-: CPT

## 2024-06-21 ENCOUNTER — TELEPHONE (OUTPATIENT)
Dept: FAMILY MEDICINE CLINIC | Facility: CLINIC | Age: 59
End: 2024-06-21
Payer: COMMERCIAL

## 2024-06-21 DIAGNOSIS — Z13.6 ENCOUNTER FOR SCREENING FOR CORONARY ARTERY DISEASE: Primary | ICD-10-CM

## 2024-06-21 NOTE — TELEPHONE ENCOUNTER
I have placed the order.  If it is not covered by insurance, there are placed she can get it done that may offer it for about $100 out of pocket.  We will submit it to insurance for approval first.

## 2024-06-21 NOTE — TELEPHONE ENCOUNTER
Caller: Jd Prekins    Relationship: Self    Best call back number: [phone]     What orders are you requesting (i.e. lab or imaging): CALCIUM SCORING TEST     In what timeframe would the patient need to come in: ASAP    IF POSSIBLE CAN GET IT AT [facility]

## 2024-06-24 NOTE — TELEPHONE ENCOUNTER
Spoke with patient to inform her per her provider Halle Youssef   She can try [facility].  There is also [facility] and I believe it is $99 there   Patient would like for her to be faxed to [facility]

## 2024-06-25 ENCOUNTER — TELEPHONE (OUTPATIENT)
Dept: FAMILY MEDICINE CLINIC | Facility: CLINIC | Age: 59
End: 2024-06-25
Payer: COMMERCIAL

## 2024-06-25 NOTE — TELEPHONE ENCOUNTER
Caller: Jd Perkins    Relationship: Self    Best call back number: [phone]     What orders are you requesting (i.e. lab or imaging): CALCIUM SCORING TEST    Where will you receive your lab/imaging services: [facility]    Additional notes: PATIENT IS REQUESTING FOR THE CALCIUM SCORING TEST TO BE SENT TO [facility]. [facility] DID NOT RECEIVE AN ORDER YET. THEIR FAX NUMBER [phone]    PLEASE ADVISE

## 2024-07-22 ENCOUNTER — TELEPHONE (OUTPATIENT)
Dept: FAMILY MEDICINE CLINIC | Facility: CLINIC | Age: 59
End: 2024-07-22
Payer: COMMERCIAL

## 2024-07-22 NOTE — TELEPHONE ENCOUNTER
Received call from the [facility]UB stating that PT is looking for CT Heart Calcium Score imaging from [facility] and stating that she hasn't received a call from provider regarding results or if she needs to follow up. I advised my manager and Halle Youssef about this and I was told that clinical team will reach out later today regarding results.

## 2024-07-22 NOTE — TELEPHONE ENCOUNTER
I just now saw the results of her calcium score as they were not sent to me initially.  Her calcium score is 0 which is great.  This indicates a very low likelihood of coronary artery stenosis.  This is the lowest risk score she can get on this test.

## 2024-07-23 ENCOUNTER — TELEPHONE (OUTPATIENT)
Dept: FAMILY MEDICINE CLINIC | Facility: CLINIC | Age: 59
End: 2024-07-23
Payer: COMMERCIAL

## 2024-07-23 NOTE — TELEPHONE ENCOUNTER
Spoke with patient to inform her of her Calcium score test results per her provider Her calcium score is 0 which is great. This indicates a very low likelihood of coronary artery stenosis. This is the lowest risk score she can get on this test. Patient verbalized understanding and had no further question at this time

## 2024-09-12 ENCOUNTER — TRANSCRIBE ORDERS (OUTPATIENT)
Dept: ADMINISTRATIVE | Facility: HOSPITAL | Age: 59
End: 2024-09-12
Payer: COMMERCIAL

## 2024-09-12 DIAGNOSIS — Z12.31 VISIT FOR SCREENING MAMMOGRAM: Primary | ICD-10-CM

## 2024-10-28 DIAGNOSIS — K21.9 GASTROESOPHAGEAL REFLUX DISEASE, UNSPECIFIED WHETHER ESOPHAGITIS PRESENT: ICD-10-CM

## 2024-10-28 NOTE — TELEPHONE ENCOUNTER
Last visit: 11-7-23  Next visit:   Return in 1 year (on 11/7/2024), or if symptoms worsen or fail to improve, for Next scheduled follow up    Sent pt msg stating they are way overdue for appt and would request 30 day supply of meds

## 2024-10-29 RX ORDER — ATORVASTATIN CALCIUM 20 MG/1
20 TABLET, FILM COATED ORAL NIGHTLY
Qty: 30 TABLET | Refills: 0 | Status: SHIPPED | OUTPATIENT
Start: 2024-10-29

## 2024-10-29 RX ORDER — OMEPRAZOLE 40 MG/1
40 CAPSULE, DELAYED RELEASE ORAL DAILY
Qty: 30 CAPSULE | Refills: 0 | Status: SHIPPED | OUTPATIENT
Start: 2024-10-29

## 2024-12-26 DIAGNOSIS — K21.9 GASTROESOPHAGEAL REFLUX DISEASE, UNSPECIFIED WHETHER ESOPHAGITIS PRESENT: ICD-10-CM

## 2024-12-26 NOTE — TELEPHONE ENCOUNTER
Caller: Jd Perkins    Relationship: Self    Best call back number: [phone]    Requested Prescriptions:   Requested Prescriptions     Pending Prescriptions Disp Refills    atorvastatin (LIPITOR) 20 MG tablet 30 tablet 0     Sig: Take 1 tablet by mouth Every Night. Take at bedtime    omeprazole (priLOSEC) 40 MG capsule 30 capsule 0     Sig: Take 1 capsule by mouth Daily. for GERD        Pharmacy where request should be sent: [facility] [address] - [phone]  - [phone] FX     Last office visit with prescribing clinician: 11/7/2023   Last telemedicine visit with prescribing clinician: Visit date not found   Next office visit with prescribing clinician: 1/3/2025     Additional details provided by patient: PATIENT WOULD LIKE A SHORT SUPPLY OF THIS MEDICATION TO LAST UNTIL HER APPOINTMENT.     Does the patient have less than a 3 day supply:  [x] Yes  [] No    Would you like a call back once the refill request has been completed: [x] Yes [] No    If the office needs to give you a call back, can they leave a voicemail: [x] Yes [] No    Gustavo Marie Rep   12/26/24 14:08 EST

## 2024-12-27 RX ORDER — ATORVASTATIN CALCIUM 20 MG/1
20 TABLET, FILM COATED ORAL NIGHTLY
Qty: 7 TABLET | Refills: 0 | Status: SHIPPED | OUTPATIENT
Start: 2024-12-27

## 2024-12-27 RX ORDER — OMEPRAZOLE 40 MG/1
40 CAPSULE, DELAYED RELEASE ORAL DAILY
Qty: 7 CAPSULE | Refills: 0 | Status: SHIPPED | OUTPATIENT
Start: 2024-12-27

## 2025-01-03 ENCOUNTER — OFFICE VISIT (OUTPATIENT)
Dept: FAMILY MEDICINE CLINIC | Facility: CLINIC | Age: 60
End: 2025-01-03
Payer: COMMERCIAL

## 2025-01-03 VITALS
HEART RATE: 74 BPM | BODY MASS INDEX: 34.8 KG/M2 | WEIGHT: 235 LBS | HEIGHT: 69 IN | DIASTOLIC BLOOD PRESSURE: 70 MMHG | OXYGEN SATURATION: 98 % | SYSTOLIC BLOOD PRESSURE: 132 MMHG

## 2025-01-03 DIAGNOSIS — K21.9 GASTROESOPHAGEAL REFLUX DISEASE, UNSPECIFIED WHETHER ESOPHAGITIS PRESENT: ICD-10-CM

## 2025-01-03 DIAGNOSIS — E78.2 MIXED HYPERLIPIDEMIA: Primary | ICD-10-CM

## 2025-01-03 DIAGNOSIS — E55.9 VITAMIN D DEFICIENCY: ICD-10-CM

## 2025-01-03 PROCEDURE — 99214 OFFICE O/P EST MOD 30 MIN: CPT | Performed by: NURSE PRACTITIONER

## 2025-01-03 RX ORDER — ATORVASTATIN CALCIUM 20 MG/1
20 TABLET, FILM COATED ORAL NIGHTLY
Qty: 90 TABLET | Refills: 3 | Status: SHIPPED | OUTPATIENT
Start: 2025-01-03

## 2025-01-03 RX ORDER — ALBUTEROL SULFATE 90 UG/1
2 INHALANT RESPIRATORY (INHALATION) EVERY 4 HOURS PRN
Qty: 8 G | Refills: 2 | Status: SHIPPED | OUTPATIENT
Start: 2025-01-03

## 2025-01-03 RX ORDER — OMEPRAZOLE 40 MG/1
40 CAPSULE, DELAYED RELEASE ORAL DAILY
Qty: 90 CAPSULE | Refills: 3 | Status: SHIPPED | OUTPATIENT
Start: 2025-01-03

## 2025-01-03 NOTE — PROGRESS NOTES
"Subjective   Jd Perkins is a 59 y.o. female.     Heartburn  She reports no chest pain.   Hyperlipidemia  Pertinent negatives include no chest pain or shortness of breath.       Since the last visit, she has overall felt fairly well.  She has GERD controlled on PPI Rx and Hyperlipidemia with goals met with current Rx.  she has been compliant with current medications have reviewed them.  The patient denies medication side effects.  Will refill medications. /70   Pulse 74   Ht 175.3 cm (69\")   Wt 107 kg (235 lb)   SpO2 98%   BMI 34.70 kg/m² .  BMI is >= 30 and <35. (Class 1 Obesity). The following options were offered after discussion;: pharmacological intervention options- not covered.       Results for orders placed or performed in visit on 11/07/23   Comprehensive metabolic panel    Collection Time: 11/07/23 11:47 AM    Specimen: Blood   Result Value Ref Range    Glucose 91 65 - 99 mg/dL    BUN 16 8 - 23 mg/dL    Creatinine 0.70 0.57 - 1.00 mg/dL    EGFR Result 99.2 >60.0 mL/min/1.73    BUN/Creatinine Ratio 22.9 7.0 - 25.0    Sodium 140 136 - 145 mmol/L    Potassium 4.4 3.5 - 5.2 mmol/L    Chloride 104 98 - 107 mmol/L    Total CO2 29.0 22.0 - 29.0 mmol/L    Calcium 9.9 8.6 - 10.5 mg/dL    Total Protein 6.8 6.0 - 8.5 g/dL    Albumin 5.0 3.5 - 5.2 g/dL    Globulin 1.8 gm/dL    A/G Ratio 2.8 g/dL    Total Bilirubin 0.5 0.0 - 1.2 mg/dL    Alkaline Phosphatase 95 39 - 117 U/L    AST (SGOT) 38 (H) 1 - 32 U/L    ALT (SGPT) 68 (H) 1 - 33 U/L   Lipid panel    Collection Time: 11/07/23 11:47 AM    Specimen: Blood   Result Value Ref Range    Total Cholesterol 212 (H) 0 - 200 mg/dL    Triglycerides 245 (H) 0 - 150 mg/dL    HDL Cholesterol 39 (L) 40 - 60 mg/dL    VLDL Cholesterol Bernardo 44 (H) 5 - 40 mg/dL    LDL Chol Calc (NIH) 129 (H) 0 - 100 mg/dL   TSH    Collection Time: 11/07/23 11:47 AM    Specimen: Blood   Result Value Ref Range    TSH 0.781 0.270 - 4.200 uIU/mL   Vitamin D 25 hydroxy    Collection Time: " 11/07/23 11:47 AM    Specimen: Blood   Result Value Ref Range    25 Hydroxy, Vitamin D 28.1 (L) 30.0 - 100.0 ng/ml   CBC and Differential    Collection Time: 11/07/23 11:47 AM    Specimen: Blood   Result Value Ref Range    WBC 6.43 3.40 - 10.80 10*3/mm3    RBC 4.83 3.77 - 5.28 10*6/mm3    Hemoglobin 15.2 12.0 - 15.9 g/dL    Hematocrit 44.7 34.0 - 46.6 %    MCV 92.5 79.0 - 97.0 fL    MCH 31.5 26.6 - 33.0 pg    MCHC 34.0 31.5 - 35.7 g/dL    RDW 12.7 12.3 - 15.4 %    Platelets 183 140 - 450 10*3/mm3    Neutrophil Rel % 52.3 42.7 - 76.0 %    Lymphocyte Rel % 37.6 19.6 - 45.3 %    Monocyte Rel % 6.7 5.0 - 12.0 %    Eosinophil Rel % 2.0 0.3 - 6.2 %    Basophil Rel % 0.8 0.0 - 1.5 %    Neutrophils Absolute 3.36 1.70 - 7.00 10*3/mm3    Lymphocytes Absolute 2.42 0.70 - 3.10 10*3/mm3    Monocytes Absolute 0.43 0.10 - 0.90 10*3/mm3    Eosinophils Absolute 0.13 0.00 - 0.40 10*3/mm3    Basophils Absolute 0.05 0.00 - 0.20 10*3/mm3    Immature Granulocyte Rel % 0.6 (H) 0.0 - 0.5 %    Immature Grans Absolute 0.04 0.00 - 0.05 10*3/mm3    nRBC 0.0 0.0 - 0.2 /100 WBC       Wheezing at night.      The following portions of the patient's history were reviewed and updated as appropriate: allergies, current medications, past family history, past medical history, past social history, past surgical history, and problem list.    Review of Systems   Constitutional:  Negative for unexpected weight change.   Respiratory:  Negative for shortness of breath.    Cardiovascular:  Negative for chest pain and palpitations.   Psychiatric/Behavioral:  Negative for behavioral problems.        Objective   Physical Exam  Vitals and nursing note reviewed.   Constitutional:       Appearance: Normal appearance. She is well-developed.   Neck:      Vascular: No carotid bruit.   Cardiovascular:      Rate and Rhythm: Normal rate and regular rhythm.   Pulmonary:      Effort: Pulmonary effort is normal.      Breath sounds: Normal breath sounds.   Neurological:       Mental Status: She is alert and oriented to person, place, and time.   Psychiatric:         Mood and Affect: Mood normal.         Behavior: Behavior normal.         Thought Content: Thought content normal.         Judgment: Judgment normal.         Assessment & Plan   Diagnoses and all orders for this visit:    1. Mixed hyperlipidemia (Primary)  -     Comprehensive metabolic panel  -     Lipid panel  -     CBC and Differential  -     TSH  -     Vitamin D 25 hydroxy    2. Gastroesophageal reflux disease, unspecified whether esophagitis present  -     Comprehensive metabolic panel  -     Lipid panel  -     CBC and Differential  -     TSH  -     Vitamin D 25 hydroxy  -     omeprazole (priLOSEC) 40 MG capsule; Take 1 capsule by mouth Daily. for GERD  Dispense: 90 capsule; Refill: 3    3. Vitamin D deficiency  -     Comprehensive metabolic panel  -     Lipid panel  -     CBC and Differential  -     TSH  -     Vitamin D 25 hydroxy    Other orders  -     atorvastatin (LIPITOR) 20 MG tablet; Take 1 tablet by mouth Every Night. Take at bedtime  Dispense: 90 tablet; Refill: 3  -     albuterol sulfate  (90 Base) MCG/ACT inhaler; Inhale 2 puffs Every 4 (Four) Hours As Needed for Wheezing.  Dispense: 8 g; Refill: 2          Will be due for colonoscopy in July.

## 2025-01-09 LAB
25(OH)D3+25(OH)D2 SERPL-MCNC: 32.9 NG/ML (ref 30–100)
ALBUMIN SERPL-MCNC: 4.6 G/DL (ref 3.9–4.9)
ALP SERPL-CCNC: 96 IU/L (ref 44–121)
ALT SERPL-CCNC: 90 IU/L (ref 0–32)
AST SERPL-CCNC: 79 IU/L (ref 0–40)
BASOPHILS # BLD AUTO: 0.1 X10E3/UL (ref 0–0.2)
BASOPHILS NFR BLD AUTO: 1 %
BILIRUB SERPL-MCNC: 0.9 MG/DL (ref 0–1.2)
BUN SERPL-MCNC: 16 MG/DL (ref 8–27)
BUN/CREAT SERPL: 19 (ref 12–28)
CALCIUM SERPL-MCNC: 9.9 MG/DL (ref 8.7–10.3)
CHLORIDE SERPL-SCNC: 101 MMOL/L (ref 96–106)
CHOLEST SERPL-MCNC: 170 MG/DL (ref 100–199)
CO2 SERPL-SCNC: 26 MMOL/L (ref 20–29)
CREAT SERPL-MCNC: 0.83 MG/DL (ref 0.57–1)
EGFRCR SERPLBLD CKD-EPI 2021: 80 ML/MIN/1.73
EOSINOPHIL # BLD AUTO: 0.2 X10E3/UL (ref 0–0.4)
EOSINOPHIL NFR BLD AUTO: 2 %
ERYTHROCYTE [DISTWIDTH] IN BLOOD BY AUTOMATED COUNT: 12.7 % (ref 11.7–15.4)
GLOBULIN SER CALC-MCNC: 2.4 G/DL (ref 1.5–4.5)
GLUCOSE SERPL-MCNC: 104 MG/DL (ref 70–99)
HCT VFR BLD AUTO: 46.6 % (ref 34–46.6)
HDLC SERPL-MCNC: 38 MG/DL
HGB BLD-MCNC: 15.1 G/DL (ref 11.1–15.9)
IMM GRANULOCYTES # BLD AUTO: 0 X10E3/UL (ref 0–0.1)
IMM GRANULOCYTES NFR BLD AUTO: 0 %
LDLC SERPL CALC-MCNC: 101 MG/DL (ref 0–99)
LYMPHOCYTES # BLD AUTO: 2.4 X10E3/UL (ref 0.7–3.1)
LYMPHOCYTES NFR BLD AUTO: 33 %
MCH RBC QN AUTO: 30.4 PG (ref 26.6–33)
MCHC RBC AUTO-ENTMCNC: 32.4 G/DL (ref 31.5–35.7)
MCV RBC AUTO: 94 FL (ref 79–97)
MONOCYTES # BLD AUTO: 0.6 X10E3/UL (ref 0.1–0.9)
MONOCYTES NFR BLD AUTO: 8 %
NEUTROPHILS # BLD AUTO: 4.2 X10E3/UL (ref 1.4–7)
NEUTROPHILS NFR BLD AUTO: 56 %
PLATELET # BLD AUTO: 163 X10E3/UL (ref 150–450)
POTASSIUM SERPL-SCNC: 4.6 MMOL/L (ref 3.5–5.2)
PROT SERPL-MCNC: 7 G/DL (ref 6–8.5)
RBC # BLD AUTO: 4.96 X10E6/UL (ref 3.77–5.28)
SODIUM SERPL-SCNC: 140 MMOL/L (ref 134–144)
TRIGL SERPL-MCNC: 178 MG/DL (ref 0–149)
TSH SERPL DL<=0.005 MIU/L-ACNC: 1.19 UIU/ML (ref 0.45–4.5)
VLDLC SERPL CALC-MCNC: 31 MG/DL (ref 5–40)
WBC # BLD AUTO: 7.5 X10E3/UL (ref 3.4–10.8)

## 2025-01-10 DIAGNOSIS — R74.8 ELEVATED LIVER ENZYMES: Primary | ICD-10-CM

## 2025-07-07 RX ORDER — ALBUTEROL SULFATE 90 UG/1
2 AEROSOL, METERED RESPIRATORY (INHALATION) EVERY 4 HOURS PRN
Qty: 18 G | Refills: 2 | Status: SHIPPED | OUTPATIENT
Start: 2025-07-07

## 2025-07-07 NOTE — TELEPHONE ENCOUNTER
Rx Refill Note  Requested Prescriptions     Pending Prescriptions Disp Refills    Ventolin  (90 Base) MCG/ACT inhaler [Pharmacy Med Name: Ventolin HFA 90 mcg/actuation aerosol inhaler] 18 g 2     Sig: Inhale 2 puffs Every 4 (Four) Hours As Needed for Wheezing.      Last office visit with prescribing clinician: 1/3/2025   Last telemedicine visit with prescribing clinician: Visit date not found   Next office visit with prescribing clinician: Visit date not found     KIN Rea(R)  07/07/25, 10:45 EDT

## 2025-07-11 VITALS
RESPIRATION RATE: 18 BRPM | RESPIRATION RATE: 21 BRPM | SYSTOLIC BLOOD PRESSURE: 138 MMHG | RESPIRATION RATE: 24 BRPM | SYSTOLIC BLOOD PRESSURE: 123 MMHG | SYSTOLIC BLOOD PRESSURE: 136 MMHG | SYSTOLIC BLOOD PRESSURE: 130 MMHG | HEART RATE: 70 BPM | DIASTOLIC BLOOD PRESSURE: 84 MMHG | SYSTOLIC BLOOD PRESSURE: 160 MMHG | HEART RATE: 68 BPM | DIASTOLIC BLOOD PRESSURE: 81 MMHG | TEMPERATURE: 97 F | WEIGHT: 230 LBS | HEIGHT: 68 IN | DIASTOLIC BLOOD PRESSURE: 58 MMHG | RESPIRATION RATE: 20 BRPM | DIASTOLIC BLOOD PRESSURE: 51 MMHG | RESPIRATION RATE: 10 BRPM | SYSTOLIC BLOOD PRESSURE: 122 MMHG | DIASTOLIC BLOOD PRESSURE: 75 MMHG | HEART RATE: 69 BPM | HEART RATE: 59 BPM | HEART RATE: 63 BPM | RESPIRATION RATE: 17 BRPM | RESPIRATION RATE: 22 BRPM | DIASTOLIC BLOOD PRESSURE: 54 MMHG | TEMPERATURE: 98 F | DIASTOLIC BLOOD PRESSURE: 74 MMHG | HEART RATE: 65 BPM | SYSTOLIC BLOOD PRESSURE: 90 MMHG | OXYGEN SATURATION: 97 % | DIASTOLIC BLOOD PRESSURE: 59 MMHG | HEART RATE: 64 BPM | SYSTOLIC BLOOD PRESSURE: 127 MMHG | OXYGEN SATURATION: 96 % | DIASTOLIC BLOOD PRESSURE: 61 MMHG | OXYGEN SATURATION: 98 %

## 2025-07-16 ENCOUNTER — AMBULATORY SURGICAL CENTER (AMBULATORY)
Dept: URBAN - METROPOLITAN AREA SURGERY 17 | Facility: SURGERY | Age: 60
End: 2025-07-16
Payer: COMMERCIAL

## 2025-07-16 DIAGNOSIS — Z09 ENCOUNTER FOR FOLLOW-UP EXAMINATION AFTER COMPLETED TREATMEN: ICD-10-CM

## 2025-07-16 DIAGNOSIS — Z86.0101 PERSONAL HISTORY OF ADENOMATOUS AND SERRATED COLON POLYPS: ICD-10-CM

## 2025-07-16 DIAGNOSIS — D12.2 BENIGN NEOPLASM OF ASCENDING COLON: ICD-10-CM

## 2025-07-16 DIAGNOSIS — K62.1 RECTAL POLYP: ICD-10-CM

## 2025-07-16 DIAGNOSIS — D12.3 BENIGN NEOPLASM OF TRANSVERSE COLON: ICD-10-CM

## 2025-07-16 LAB
GI HISTOLOGY: C. RECTUM: (no result)
GI HISTOLOGY: PDF REPORT: (no result)
Lab: (no result)
Lab: (no result)

## 2025-07-16 PROCEDURE — 45385 COLONOSCOPY W/LESION REMOVAL: CPT | Mod: 33 | Performed by: INTERNAL MEDICINE
